# Patient Record
Sex: MALE | Race: WHITE | Employment: OTHER | URBAN - METROPOLITAN AREA
[De-identification: names, ages, dates, MRNs, and addresses within clinical notes are randomized per-mention and may not be internally consistent; named-entity substitution may affect disease eponyms.]

---

## 2018-09-16 ENCOUNTER — APPOINTMENT (OUTPATIENT)
Dept: GENERAL RADIOLOGY | Age: 73
End: 2018-09-16
Payer: MEDICARE

## 2018-09-16 ENCOUNTER — HOSPITAL ENCOUNTER (EMERGENCY)
Age: 73
Discharge: LEFT BEFORE DISCHARGE | End: 2018-09-16
Attending: EMERGENCY MEDICINE
Payer: MEDICARE

## 2018-09-16 VITALS
RESPIRATION RATE: 16 BRPM | SYSTOLIC BLOOD PRESSURE: 173 MMHG | BODY MASS INDEX: 30.18 KG/M2 | OXYGEN SATURATION: 98 % | TEMPERATURE: 97.4 F | HEIGHT: 62 IN | WEIGHT: 164 LBS | HEART RATE: 80 BPM | DIASTOLIC BLOOD PRESSURE: 85 MMHG

## 2018-09-16 DIAGNOSIS — R07.9 CHEST PAIN, UNSPECIFIED TYPE: Primary | ICD-10-CM

## 2018-09-16 LAB
ANION GAP SERPL CALCULATED.3IONS-SCNC: 13 MMOL/L (ref 7–16)
BUN BLDV-MCNC: 21 MG/DL (ref 8–23)
CALCIUM SERPL-MCNC: 9.4 MG/DL (ref 8.6–10.2)
CHLORIDE BLD-SCNC: 99 MMOL/L (ref 98–107)
CO2: 25 MMOL/L (ref 22–29)
CREAT SERPL-MCNC: 1.1 MG/DL (ref 0.7–1.2)
GFR AFRICAN AMERICAN: >60
GFR NON-AFRICAN AMERICAN: >60 ML/MIN/1.73
GLUCOSE BLD-MCNC: 154 MG/DL (ref 74–109)
HCT VFR BLD CALC: 39.1 % (ref 37–54)
HEMOGLOBIN: 13.4 G/DL (ref 12.5–16.5)
MCH RBC QN AUTO: 29.6 PG (ref 26–35)
MCHC RBC AUTO-ENTMCNC: 34.3 % (ref 32–34.5)
MCV RBC AUTO: 86.3 FL (ref 80–99.9)
PDW BLD-RTO: 13.5 FL (ref 11.5–15)
PLATELET # BLD: 156 E9/L (ref 130–450)
PMV BLD AUTO: 11.2 FL (ref 7–12)
POTASSIUM SERPL-SCNC: 4 MMOL/L (ref 3.5–5)
PRO-BNP: 140 PG/ML (ref 0–125)
RBC # BLD: 4.53 E12/L (ref 3.8–5.8)
SODIUM BLD-SCNC: 137 MMOL/L (ref 132–146)
TROPONIN: <0.01 NG/ML (ref 0–0.03)
WBC # BLD: 8.7 E9/L (ref 4.5–11.5)

## 2018-09-16 PROCEDURE — 96374 THER/PROPH/DIAG INJ IV PUSH: CPT

## 2018-09-16 PROCEDURE — 6370000000 HC RX 637 (ALT 250 FOR IP): Performed by: EMERGENCY MEDICINE

## 2018-09-16 PROCEDURE — 80048 BASIC METABOLIC PNL TOTAL CA: CPT

## 2018-09-16 PROCEDURE — 2500000003 HC RX 250 WO HCPCS: Performed by: EMERGENCY MEDICINE

## 2018-09-16 PROCEDURE — 71045 X-RAY EXAM CHEST 1 VIEW: CPT

## 2018-09-16 PROCEDURE — 85027 COMPLETE CBC AUTOMATED: CPT

## 2018-09-16 PROCEDURE — 83880 ASSAY OF NATRIURETIC PEPTIDE: CPT

## 2018-09-16 PROCEDURE — 99285 EMERGENCY DEPT VISIT HI MDM: CPT

## 2018-09-16 PROCEDURE — 84484 ASSAY OF TROPONIN QUANT: CPT

## 2018-09-16 PROCEDURE — S0028 INJECTION, FAMOTIDINE, 20 MG: HCPCS | Performed by: EMERGENCY MEDICINE

## 2018-09-16 RX ORDER — ASPIRIN 81 MG/1
243 TABLET, CHEWABLE ORAL ONCE
Status: COMPLETED | OUTPATIENT
Start: 2018-09-16 | End: 2018-09-16

## 2018-09-16 RX ADMIN — ASPIRIN 81 MG 243 MG: 81 TABLET ORAL at 20:23

## 2018-09-16 RX ADMIN — FAMOTIDINE 20 MG: 10 INJECTION, SOLUTION INTRAVENOUS at 20:23

## 2018-09-16 ASSESSMENT — ENCOUNTER SYMPTOMS
EYE REDNESS: 0
ABDOMINAL PAIN: 0
DIARRHEA: 0
NAUSEA: 0
VOMITING: 0
EYE DISCHARGE: 0
SHORTNESS OF BREATH: 1
BACK PAIN: 0
SORE THROAT: 0
EYE PAIN: 0
WHEEZING: 0
SINUS PRESSURE: 0
COUGH: 0

## 2018-09-16 ASSESSMENT — PAIN SCALES - GENERAL
PAINLEVEL_OUTOF10: 8
PAINLEVEL_OUTOF10: 0

## 2018-09-16 ASSESSMENT — PAIN DESCRIPTION - ORIENTATION: ORIENTATION: MID

## 2018-09-16 ASSESSMENT — PAIN DESCRIPTION - ONSET: ONSET: SUDDEN

## 2018-09-16 ASSESSMENT — PAIN DESCRIPTION - DESCRIPTORS: DESCRIPTORS: PATIENT UNABLE TO DESCRIBE

## 2018-09-16 ASSESSMENT — PAIN DESCRIPTION - PAIN TYPE: TYPE: ACUTE PAIN

## 2018-09-16 ASSESSMENT — PAIN DESCRIPTION - FREQUENCY: FREQUENCY: CONTINUOUS

## 2018-09-16 ASSESSMENT — PAIN DESCRIPTION - LOCATION: LOCATION: CHEST

## 2018-09-16 NOTE — ED NOTES
FIRST PROVIDER CONTACT ASSESSMENT NOTE      Department of Emergency Medicine   9/16/18  7:25 PM    Chief Complaint: Chest Pain (started this afternoon, midsternal - pain increases with deep inspiration) and Shortness of Breath      History of Present Illness:    Unruly Morel is a 68 y.o. male who presents to the ED by private car for chest pain  Focused Screening Exam:  Constitutional:  Alert, appears stated age and is in no distress. *ALLERGIES*     Patient has no known allergies.      ED Triage Vitals   BP Temp Temp src Pulse Resp SpO2 Height Weight   -- -- -- -- -- -- -- --        Initial Plan of Care:  Initiate Treatment-Testing, Proceed toTreatment Area When Bed Available for ED Attending/MLP to Continue Care    -----------------END OF FIRST PROVIDER CONTACT ASSESSMENT NOTE--------------  Electronically signed by SHAMA Moscoso   DD: 9/16/18     SHAMA Moscoso  09/16/18 2001

## 2018-09-16 NOTE — ED PROVIDER NOTES
79-year-old male who presents for evaluation of chest pain. Patient describes midsternal chest pain that was 8/10 in severity and he describes as feeling his chest is going to explode. Reports the pain is currently better at 6/10. Reports pain was worse with exertion. He complains of associated shortness of breath. He does report history of GERD and did eat a lot of salsa and spicy food today. Reports he took 3 Prilosec tablets without relief of his pain. Reports his last stress test was 3 years ago. He has a history of hypertension, high cholesterol, diabetes. Review of Systems   Constitutional: Negative for chills and fever. HENT: Negative for ear pain, sinus pressure and sore throat. Eyes: Negative for pain, discharge and redness. Respiratory: Positive for shortness of breath. Negative for cough and wheezing. Cardiovascular: Positive for chest pain. Negative for palpitations and leg swelling. Gastrointestinal: Negative for abdominal pain, diarrhea, nausea and vomiting. Genitourinary: Negative for dysuria and frequency. Musculoskeletal: Negative for arthralgias and back pain. Skin: Negative for rash and wound. Neurological: Negative for weakness and headaches. Hematological: Negative for adenopathy. All other systems reviewed and are negative. Physical Exam   Constitutional: He is oriented to person, place, and time. He appears well-developed and well-nourished. HENT:   Head: Normocephalic and atraumatic. Eyes: Conjunctivae are normal.   Neck: Normal range of motion. Neck supple. Cardiovascular: Normal rate, regular rhythm and normal heart sounds. No murmur heard. Pulmonary/Chest: Effort normal and breath sounds normal. No respiratory distress. He has no wheezes. He has no rales. Abdominal: Soft. Bowel sounds are normal. There is tenderness (epigastric). There is no rebound and no guarding. Musculoskeletal: He exhibits no edema, tenderness or deformity. show no change  Repeat physical examination is not changed    I have spoken with the patient and discussed todays availabe results to this point, in addition to providing specific details for the plan of care and counseling regarding the diagnosis and prognosis. Their questions are answered, however they are not agreeable to admission.     --------------------------------- ADDITIONAL PROVIDER NOTES ---------------------------------  This patient has chosen to leave against medical advice. I have personally explained to them that choosing to do so may result in permanent bodily harm or death. I discussed at length that without further evaluation and monitoring there may be unforeseen circumstances and deterioration resulting in permanent bodily harm or death as a result of their choice. They are alert, oriented, and competent at this time. They state that they are aware of the serious risks as explained, but they continue to wish to leave against medical   advice. In light of their decision to leave against medical advice, follow-up has been arranged and they are aware of the importance of following up as instructed. They have been advised that they should return to the ED immediately if they change their mind at any time, or if their condition begins to change or worsen. The follow up plan has been discussed in detail and they are aware of the specific conditions for emergent return, as well as the importance of follow-up. There are no discharge medications for this patient. Diagnosis:  1. Chest pain, unspecified type        Disposition:  Patient's disposition: Left against medical advice. Patient's condition is stable.            Austen Huang DO  Resident  09/16/18 [id number]

## 2018-09-17 NOTE — ED NOTES
Pt states \"I feel better and I want to leave\". Explained AMA process in detail and informed him that plan of care was not complete, that we would like for him to stay for additional monitoring. Pt stated again that he wanted to leave. Obtained signed AMA from pt and educated on discharge instructions.      Scott Gutierres RN  09/16/18 [id number]

## 2018-09-21 LAB
EKG ATRIAL RATE: 62 BPM
EKG P AXIS: 57 DEGREES
EKG P-R INTERVAL: 130 MS
EKG Q-T INTERVAL: 412 MS
EKG QRS DURATION: 82 MS
EKG QTC CALCULATION (BAZETT): 418 MS
EKG R AXIS: 37 DEGREES
EKG T AXIS: 65 DEGREES
EKG VENTRICULAR RATE: 62 BPM

## 2021-02-25 ENCOUNTER — HOSPITAL ENCOUNTER (INPATIENT)
Age: 76
LOS: 3 days | Discharge: HOME HEALTH | DRG: 522 | End: 2021-02-28
Attending: EMERGENCY MEDICINE | Admitting: INTERNAL MEDICINE
Payer: MEDICARE

## 2021-02-25 ENCOUNTER — APPOINTMENT (OUTPATIENT)
Dept: GENERAL RADIOLOGY | Age: 76
DRG: 522 | End: 2021-02-25
Payer: MEDICARE

## 2021-02-25 DIAGNOSIS — S72.002A CLOSED FRACTURE OF NECK OF LEFT FEMUR, INITIAL ENCOUNTER (HCC): Primary | ICD-10-CM

## 2021-02-25 LAB
ABO/RH: NORMAL
ALBUMIN SERPL-MCNC: 4.5 G/DL (ref 3.5–5.2)
ALP BLD-CCNC: 140 U/L (ref 40–129)
ALT SERPL-CCNC: 48 U/L (ref 0–40)
ANION GAP SERPL CALCULATED.3IONS-SCNC: 11 MMOL/L (ref 7–16)
ANTIBODY SCREEN: NORMAL
APTT: 27.4 SEC (ref 24.5–35.1)
AST SERPL-CCNC: 38 U/L (ref 0–39)
BASOPHILS ABSOLUTE: 0.02 E9/L (ref 0–0.2)
BASOPHILS RELATIVE PERCENT: 0.2 % (ref 0–2)
BILIRUB SERPL-MCNC: 0.6 MG/DL (ref 0–1.2)
BUN BLDV-MCNC: 16 MG/DL (ref 8–23)
CALCIUM SERPL-MCNC: 9 MG/DL (ref 8.6–10.2)
CHLORIDE BLD-SCNC: 103 MMOL/L (ref 98–107)
CO2: 24 MMOL/L (ref 22–29)
CREAT SERPL-MCNC: 1.1 MG/DL (ref 0.7–1.2)
EOSINOPHILS ABSOLUTE: 0.31 E9/L (ref 0.05–0.5)
EOSINOPHILS RELATIVE PERCENT: 3.2 % (ref 0–6)
GFR AFRICAN AMERICAN: >60
GFR NON-AFRICAN AMERICAN: >60 ML/MIN/1.73
GLUCOSE BLD-MCNC: 132 MG/DL (ref 74–99)
HCT VFR BLD CALC: 40 % (ref 37–54)
HEMOGLOBIN: 13.2 G/DL (ref 12.5–16.5)
IMMATURE GRANULOCYTES #: 0.07 E9/L
IMMATURE GRANULOCYTES %: 0.7 % (ref 0–5)
INR BLD: 1
LYMPHOCYTES ABSOLUTE: 0.77 E9/L (ref 1.5–4)
LYMPHOCYTES RELATIVE PERCENT: 8 % (ref 20–42)
MCH RBC QN AUTO: 28.6 PG (ref 26–35)
MCHC RBC AUTO-ENTMCNC: 33 % (ref 32–34.5)
MCV RBC AUTO: 86.8 FL (ref 80–99.9)
METER GLUCOSE: 271 MG/DL (ref 74–99)
MONOCYTES ABSOLUTE: 0.4 E9/L (ref 0.1–0.95)
MONOCYTES RELATIVE PERCENT: 4.2 % (ref 2–12)
NEUTROPHILS ABSOLUTE: 8.02 E9/L (ref 1.8–7.3)
NEUTROPHILS RELATIVE PERCENT: 83.7 % (ref 43–80)
PDW BLD-RTO: 13.8 FL (ref 11.5–15)
PLATELET # BLD: 162 E9/L (ref 130–450)
PMV BLD AUTO: 10.4 FL (ref 7–12)
POTASSIUM REFLEX MAGNESIUM: 4.2 MMOL/L (ref 3.5–5)
PROTHROMBIN TIME: 11.4 SEC (ref 9.3–12.4)
RBC # BLD: 4.61 E12/L (ref 3.8–5.8)
SODIUM BLD-SCNC: 138 MMOL/L (ref 132–146)
TOTAL PROTEIN: 7 G/DL (ref 6.4–8.3)
WBC # BLD: 9.6 E9/L (ref 4.5–11.5)

## 2021-02-25 PROCEDURE — 6360000002 HC RX W HCPCS: Performed by: INTERNAL MEDICINE

## 2021-02-25 PROCEDURE — 2580000003 HC RX 258: Performed by: INTERNAL MEDICINE

## 2021-02-25 PROCEDURE — 85025 COMPLETE CBC W/AUTO DIFF WBC: CPT

## 2021-02-25 PROCEDURE — 71045 X-RAY EXAM CHEST 1 VIEW: CPT

## 2021-02-25 PROCEDURE — 73502 X-RAY EXAM HIP UNI 2-3 VIEWS: CPT

## 2021-02-25 PROCEDURE — 99284 EMERGENCY DEPT VISIT MOD MDM: CPT

## 2021-02-25 PROCEDURE — 6370000000 HC RX 637 (ALT 250 FOR IP): Performed by: INTERNAL MEDICINE

## 2021-02-25 PROCEDURE — 99222 1ST HOSP IP/OBS MODERATE 55: CPT | Performed by: INTERNAL MEDICINE

## 2021-02-25 PROCEDURE — 85610 PROTHROMBIN TIME: CPT

## 2021-02-25 PROCEDURE — 86900 BLOOD TYPING SEROLOGIC ABO: CPT

## 2021-02-25 PROCEDURE — 85730 THROMBOPLASTIN TIME PARTIAL: CPT

## 2021-02-25 PROCEDURE — 86901 BLOOD TYPING SEROLOGIC RH(D): CPT

## 2021-02-25 PROCEDURE — 82962 GLUCOSE BLOOD TEST: CPT

## 2021-02-25 PROCEDURE — 80053 COMPREHEN METABOLIC PANEL: CPT

## 2021-02-25 PROCEDURE — 86850 RBC ANTIBODY SCREEN: CPT

## 2021-02-25 PROCEDURE — 1200000000 HC SEMI PRIVATE

## 2021-02-25 RX ORDER — PROMETHAZINE HYDROCHLORIDE 25 MG/1
12.5 TABLET ORAL EVERY 6 HOURS PRN
Status: DISCONTINUED | OUTPATIENT
Start: 2021-02-25 | End: 2021-02-28 | Stop reason: HOSPADM

## 2021-02-25 RX ORDER — SODIUM CHLORIDE 0.9 % (FLUSH) 0.9 %
10 SYRINGE (ML) INJECTION PRN
Status: DISCONTINUED | OUTPATIENT
Start: 2021-02-25 | End: 2021-02-28 | Stop reason: HOSPADM

## 2021-02-25 RX ORDER — FENTANYL CITRATE 50 UG/ML
50 INJECTION, SOLUTION INTRAMUSCULAR; INTRAVENOUS
Status: DISCONTINUED | OUTPATIENT
Start: 2021-02-25 | End: 2021-02-25

## 2021-02-25 RX ORDER — TAMSULOSIN HYDROCHLORIDE 0.4 MG/1
0.4 CAPSULE ORAL DAILY
COMMUNITY

## 2021-02-25 RX ORDER — AMLODIPINE BESYLATE 5 MG/1
5 TABLET ORAL DAILY
Status: DISCONTINUED | OUTPATIENT
Start: 2021-02-26 | End: 2021-02-28 | Stop reason: HOSPADM

## 2021-02-25 RX ORDER — OMEPRAZOLE 20 MG/1
20 CAPSULE, DELAYED RELEASE ORAL DAILY
COMMUNITY

## 2021-02-25 RX ORDER — ACETAMINOPHEN 325 MG/1
650 TABLET ORAL EVERY 6 HOURS PRN
Status: DISCONTINUED | OUTPATIENT
Start: 2021-02-25 | End: 2021-02-27 | Stop reason: SDUPTHER

## 2021-02-25 RX ORDER — SODIUM CHLORIDE 0.9 % (FLUSH) 0.9 %
10 SYRINGE (ML) INJECTION EVERY 12 HOURS SCHEDULED
Status: DISCONTINUED | OUTPATIENT
Start: 2021-02-25 | End: 2021-02-28 | Stop reason: HOSPADM

## 2021-02-25 RX ORDER — DONEPEZIL HYDROCHLORIDE 5 MG/1
10 TABLET, FILM COATED ORAL DAILY
Status: DISCONTINUED | OUTPATIENT
Start: 2021-02-26 | End: 2021-02-28 | Stop reason: HOSPADM

## 2021-02-25 RX ORDER — FLUOXETINE 10 MG/1
10 CAPSULE ORAL DAILY
COMMUNITY

## 2021-02-25 RX ORDER — ASPIRIN 81 MG/1
81 TABLET ORAL DAILY
Status: ON HOLD | COMMUNITY
End: 2021-02-28 | Stop reason: HOSPADM

## 2021-02-25 RX ORDER — LOSARTAN POTASSIUM 50 MG/1
100 TABLET ORAL DAILY
Status: DISCONTINUED | OUTPATIENT
Start: 2021-02-26 | End: 2021-02-28 | Stop reason: HOSPADM

## 2021-02-25 RX ORDER — PANTOPRAZOLE SODIUM 40 MG/1
40 TABLET, DELAYED RELEASE ORAL
Status: DISCONTINUED | OUTPATIENT
Start: 2021-02-26 | End: 2021-02-28 | Stop reason: HOSPADM

## 2021-02-25 RX ORDER — DEXTROSE MONOHYDRATE 50 MG/ML
100 INJECTION, SOLUTION INTRAVENOUS PRN
Status: DISCONTINUED | OUTPATIENT
Start: 2021-02-25 | End: 2021-02-28 | Stop reason: HOSPADM

## 2021-02-25 RX ORDER — LOSARTAN POTASSIUM 100 MG/1
100 TABLET ORAL DAILY
COMMUNITY

## 2021-02-25 RX ORDER — AMLODIPINE BESYLATE 5 MG/1
5 TABLET ORAL DAILY
COMMUNITY

## 2021-02-25 RX ORDER — ATORVASTATIN CALCIUM 40 MG/1
40 TABLET, FILM COATED ORAL DAILY
Status: DISCONTINUED | OUTPATIENT
Start: 2021-02-26 | End: 2021-02-28 | Stop reason: HOSPADM

## 2021-02-25 RX ORDER — POLYETHYLENE GLYCOL 3350 17 G/17G
17 POWDER, FOR SOLUTION ORAL DAILY PRN
Status: DISCONTINUED | OUTPATIENT
Start: 2021-02-25 | End: 2021-02-28 | Stop reason: HOSPADM

## 2021-02-25 RX ORDER — ACETAMINOPHEN 650 MG/1
650 SUPPOSITORY RECTAL EVERY 6 HOURS PRN
Status: DISCONTINUED | OUTPATIENT
Start: 2021-02-25 | End: 2021-02-27 | Stop reason: SDUPTHER

## 2021-02-25 RX ORDER — NICOTINE POLACRILEX 4 MG
15 LOZENGE BUCCAL PRN
Status: DISCONTINUED | OUTPATIENT
Start: 2021-02-25 | End: 2021-02-28 | Stop reason: HOSPADM

## 2021-02-25 RX ORDER — FLUOXETINE 10 MG/1
10 CAPSULE ORAL DAILY
Status: DISCONTINUED | OUTPATIENT
Start: 2021-02-26 | End: 2021-02-26 | Stop reason: CLARIF

## 2021-02-25 RX ORDER — SODIUM CHLORIDE 9 MG/ML
INJECTION, SOLUTION INTRAVENOUS CONTINUOUS
Status: DISCONTINUED | OUTPATIENT
Start: 2021-02-26 | End: 2021-02-28 | Stop reason: HOSPADM

## 2021-02-25 RX ORDER — ONDANSETRON 2 MG/ML
4 INJECTION INTRAMUSCULAR; INTRAVENOUS EVERY 6 HOURS PRN
Status: DISCONTINUED | OUTPATIENT
Start: 2021-02-25 | End: 2021-02-28 | Stop reason: HOSPADM

## 2021-02-25 RX ORDER — GLIPIZIDE 10 MG/1
10 TABLET ORAL
COMMUNITY

## 2021-02-25 RX ORDER — DEXTROSE MONOHYDRATE 25 G/50ML
12.5 INJECTION, SOLUTION INTRAVENOUS PRN
Status: DISCONTINUED | OUTPATIENT
Start: 2021-02-25 | End: 2021-02-28 | Stop reason: HOSPADM

## 2021-02-25 RX ORDER — ATORVASTATIN CALCIUM 40 MG/1
40 TABLET, FILM COATED ORAL DAILY
COMMUNITY

## 2021-02-25 RX ORDER — TAMSULOSIN HYDROCHLORIDE 0.4 MG/1
0.4 CAPSULE ORAL DAILY
Status: DISCONTINUED | OUTPATIENT
Start: 2021-02-26 | End: 2021-02-28 | Stop reason: HOSPADM

## 2021-02-25 RX ADMIN — INSULIN LISPRO 2 UNITS: 100 INJECTION, SOLUTION INTRAVENOUS; SUBCUTANEOUS at 23:41

## 2021-02-25 RX ADMIN — SODIUM CHLORIDE: 9 INJECTION, SOLUTION INTRAVENOUS at 23:21

## 2021-02-25 RX ADMIN — HYDROMORPHONE HYDROCHLORIDE 0.5 MG: 1 INJECTION, SOLUTION INTRAMUSCULAR; INTRAVENOUS; SUBCUTANEOUS at 23:21

## 2021-02-25 ASSESSMENT — PAIN SCALES - GENERAL
PAINLEVEL_OUTOF10: 8
PAINLEVEL_OUTOF10: 6

## 2021-02-25 ASSESSMENT — PAIN DESCRIPTION - PROGRESSION: CLINICAL_PROGRESSION: NOT CHANGED

## 2021-02-25 ASSESSMENT — PAIN DESCRIPTION - ORIENTATION: ORIENTATION: LEFT

## 2021-02-25 ASSESSMENT — PAIN DESCRIPTION - PAIN TYPE: TYPE: ACUTE PAIN

## 2021-02-25 ASSESSMENT — PAIN DESCRIPTION - FREQUENCY: FREQUENCY: CONTINUOUS

## 2021-02-25 ASSESSMENT — PAIN DESCRIPTION - DESCRIPTORS: DESCRIPTORS: SHARP

## 2021-02-25 NOTE — ED NOTES
Bed: 01  Expected date:   Expected time:   Means of arrival:   Comments:  EMS     Rand Salcido RN  02/25/21 [id number]

## 2021-02-25 NOTE — H&P
[facility] History and Physical    CHIEF COMPLAINT: Hip pain    History of Present Illness: Patient is a 36-year-old male with past medical history significant for diabetes, hypertension. He presented to the emergency department with complaints of left hip pain after sustaining a mechanical fall this evening. He reports that his pain gets better with any type of motion, and there is tenderness to palpation. He reports that his full occurred while he was attempting to carry a table up a flight of stairs and he lost his balance and fell down approximately 7 stairs. He denies hitting his head. He denies loss of consciousness. He landed on the left hip. He is unable to ambulate since the incident. His ED work-up is significant for an x-ray showing a left femoral neck fracture. ED provider has placed a consult for orthopedics. Medicine was asked to admit. REVIEW OF SYSTEMS:  A comprehensive review of systems was negative except for: what is in the HPI    PMH:  Past Medical History:   Diagnosis Date    Agent orange exposure     Diabetes mellitus ([facility] [address]     Hypertension        Surgical History:  Past Surgical History:   Procedure Laterality Date    ARM SURGERY      left    CIRCUMCISION       Medications Prior to Admission:    Prior to Admission medications    Not on File     Allergies:    Patient has no known allergies. Social History:    reports that he has quit smoking. He has never used smokeless tobacco. He reports that he does not drink alcohol or use drugs. Family History:   family history is not on file.   Reviewed, not pertinent to admission    PHYSICAL EXAM:  Vitals:  /65   Pulse 73   Temp 97.6 °F (36.4 °C) (Oral)   Resp 16   SpO2 95%     General Appearance: alert and oriented to person, place and time and in no acute distress  Skin: warm and dry  Head: normocephalic and atraumatic  Eyes: pupils equal, round, and reactive to light, extraocular eye movements intact, conjunctivae normal  Neck: neck supple and non tender without mass   Pulmonary/Chest: clear to auscultation bilaterally- no wheezes, rales or rhonchi, normal air movement, no respiratory distress  Cardiovascular: normal rate, normal S1 and S2 and no carotid bruits  Abdomen: soft, non-tender, non-distended, normal bowel sounds, no masses or organomegaly  Extremities: Tenderness in the left hip joint. Decreased range of motion secondary to pain. Neurologic: no cranial nerve deficit and speech normal    LABS:  Recent Labs     02/25/21  1716      K 4.2      CO2 24   BUN 16   CREATININE 1.1   GLUCOSE 132*   CALCIUM 9.0       Recent Labs     02/25/21  1716   WBC 9.6   RBC 4.61   HGB 13.2   HCT 40.0   MCV 86.8   MCH 28.6   MCHC 33.0   RDW 13.8      MPV 10.4       No results for input(s): POCGLU in the last 72 hours. CBC:   Lab Results   Component Value Date    WBC 9.6 02/25/2021    RBC 4.61 02/25/2021    HGB 13.2 02/25/2021    HCT 40.0 02/25/2021    MCV 86.8 02/25/2021    MCH 28.6 02/25/2021    MCHC 33.0 02/25/2021    RDW 13.8 02/25/2021     02/25/2021    MPV 10.4 02/25/2021     CMP:    Lab Results   Component Value Date     02/25/2021    K 4.2 02/25/2021     02/25/2021    CO2 24 02/25/2021    BUN 16 02/25/2021    CREATININE 1.1 02/25/2021    GFRAA >60 02/25/2021    LABGLOM >60 02/25/2021    GLUCOSE 132 02/25/2021    PROT 7.0 02/25/2021    LABALBU 4.5 02/25/2021    CALCIUM 9.0 02/25/2021    BILITOT 0.6 02/25/2021    ALKPHOS 140 02/25/2021    AST 38 02/25/2021    ALT 48 02/25/2021       Radiology:   XR HIP 2-3 VW W PELVIS LEFT   Final Result   Nondisplaced left femoral neck fracture      XR CHEST PORTABLE   Final Result   No pneumonia or pleural effusion. ASSESSMENT:      Active Problems:    Hip fracture requiring operative repair, left, closed, initial encounter [facility])  Resolved Problems:    * No resolved hospital problems.  *    PLAN:    Left femoral neck fracture, nondisplaced  80-year-old male presenting with left hip pain after mechanical fall. X-ray shows nondisplaced left femoral neck fracture.  -Admit to surgical unit  -Orthopedic surgery consult  -Pain control  -N.p.o. after midnight    Non-insulin-dependent diabetes  -Sliding scale insulin  -Hypoglycemia protocol  -Glucose checks before meals and at bedtime    Hypertension  -Continue home dose of losartan, amlodipine    Dementia  -Continue home dose of donepezil, fluoxetine    GERD  -Continue PPI    BPH  -Continue home dose of tamsulosin    Hyperlipidemia  -Continue home dose of atorvastatin    Code Status: Full  DVT prophylaxis: SCDs    NOTE: This report was transcribed using voice recognition software. Every effort was made to ensure accuracy; however, inadvertent computerized transcription errors may be present.   Electronically signed by Gisselle Chacon DO on 2/25/2021 at 6:05 PM

## 2021-02-25 NOTE — ED PROVIDER NOTES
HPI:  2/25/21, Time: 4:49 PM ZOE Velázquez is a 77 y.o. male presenting to the ED for left hip pain occurring prior to arrival.  Symptoms have been moderate in severity and constant since onset. Pain is worse with movement and better with rest.  He did not take anything for his symptoms. States that he was carrying a table up a flight of stairs when he lost his balance and fell down approximately 7 steps. No head trauma or loss of consciousness. States he fell onto his left hip. He has been unable to ambulate since the incident. He denies headache, neck pain, back pain, chest pain, shortness of breath, abdominal pain, nausea, and vomiting. He takes no anticoagulation. Review of Systems:   Pertinent positives and negatives are stated within HPI, all other systems reviewed and are negative.          --------------------------------------------- PAST HISTORY ---------------------------------------------  Past Medical History:  has a past medical history of Agent orange exposure, Diabetes mellitus ([address] and Hypertension. Past Surgical History:  has a past surgical history that includes Arm Surgery and Circumcision. Social History:  reports that he has quit smoking. He has never used smokeless tobacco. He reports that he does not drink alcohol or use drugs. Family History: family history is not on file. The patients home medications have been reviewed. Allergies: Patient has no known allergies.     -------------------------------------------------- RESULTS -------------------------------------------------  All laboratory and radiology results have been personally reviewed by myself   LABS:  Results for orders placed or performed during the hospital encounter of 02/25/21   CBC Auto Differential   Result Value Ref Range    WBC 9.6 4.5 - 11.5 E9/L    RBC 4.61 3.80 - 5.80 E12/L    Hemoglobin 13.2 12.5 - 16.5 g/dL    Hematocrit 40.0 37.0 - 54.0 %    MCV 86.8 80.0 - 99.9 fL    MCH 28.6 26.0 - 35.0 pg    MCHC 33.0 32.0 - 34.5 %    RDW 13.8 11.5 - 15.0 fL    Platelets 067 697 - 108 E9/L    MPV 10.4 7.0 - 12.0 fL    Neutrophils % 83.7 (H) 43.0 - 80.0 %    Immature Granulocytes % 0.7 0.0 - 5.0 %    Lymphocytes % 8.0 (L) 20.0 - 42.0 %    Monocytes % 4.2 2.0 - 12.0 %    Eosinophils % 3.2 0.0 - 6.0 %    Basophils % 0.2 0.0 - 2.0 %    Neutrophils Absolute 8.02 (H) 1.80 - 7.30 E9/L    Immature Granulocytes # 0.07 E9/L    Lymphocytes Absolute 0.77 (L) 1.50 - 4.00 E9/L    Monocytes Absolute 0.40 0.10 - 0.95 E9/L    Eosinophils Absolute 0.31 0.05 - 0.50 E9/L    Basophils Absolute 0.02 0.00 - 0.20 E9/L   Comprehensive Metabolic Panel w/ Reflex to MG   Result Value Ref Range    Sodium 138 132 - 146 mmol/L    Potassium reflex Magnesium 4.2 3.5 - 5.0 mmol/L    Chloride 103 98 - 107 mmol/L    CO2 24 22 - 29 mmol/L    Anion Gap 11 7 - 16 mmol/L    Glucose 132 (H) 74 - 99 mg/dL    BUN 16 8 - 23 mg/dL    CREATININE 1.1 0.7 - 1.2 mg/dL    GFR Non-African American >60 >=60 mL/min/1.73    GFR African American >60     Calcium 9.0 8.6 - 10.2 mg/dL    Total Protein 7.0 6.4 - 8.3 g/dL    Albumin 4.5 3.5 - 5.2 g/dL    Total Bilirubin 0.6 0.0 - 1.2 mg/dL    Alkaline Phosphatase 140 (H) 40 - 129 U/L    ALT 48 (H) 0 - 40 U/L    AST 38 0 - 39 U/L   Protime-INR   Result Value Ref Range    Protime 11.4 9.3 - 12.4 sec    INR 1.0    APTT   Result Value Ref Range    aPTT 27.4 24.5 - 35.1 sec   TYPE AND SCREEN   Result Value Ref Range    ABO/Rh O POS     Antibody Screen NEG        RADIOLOGY:  Interpreted by Radiologist.  XR HIP 2-3 VW W PELVIS LEFT   Final Result   Nondisplaced left femoral neck fracture      XR CHEST PORTABLE   Final Result   No pneumonia or pleural effusion.          ------------------------- NURSING NOTES AND VITALS REVIEWED ---------------------------   The nursing notes within the ED encounter and vital signs as below have been reviewed.    /65   Pulse 73   Temp 97.6 °F (36.4 °C) (Oral)   Resp 16 SpO2 95%   Oxygen Saturation Interpretation: Normal      ---------------------------------------------------PHYSICAL EXAM--------------------------------------      PHYSICAL EXAM:  Vitals Reviewed  Constitutional/General: Alert and oriented x3, well appearing, non toxic in NAD. HEENT: Normocephalic and atraumatic. PERRL, EOMI. Oropharynx clear, handling secretions, no trismus. No raccoon eyes. No leigh's sign. Neck: Supple, full ROM, no cervical spine tenderness. Pulmonary: Lungs clear to auscultation bilaterally, no wheezes, rales, or rhonchi. Not in respiratory distress. Cardiovascular:  Regular rate and rhythm, no murmurs, gallops, or rubs. 2+ distal pulses. Chest: No chest wall tenderness. No crepitus. Abdomen: Soft, non tender, non distended,   Pelvis: Tenderness to left lateral hip with decreased range of motion due to pain, no distal femur tenderness, soft and easily compressible compartments, DP and PT pulses intact. Superficial abrasion near left elbow without tenderness or effusion, normal range of motion to elbow, no warmth erythema to joint, no tenderness to upper arm or forearm. Extremities: Moves all extremities x 4. Warm and well perfused. Back: No midline thoracic or lumbar tenderness. Skin: warm and dry without rash. Neurologic: GCS 15, 5/5 strength in all extremities. Normal sensation in all extremities.   Psych: Normal Affect.      ------------------------------ ED COURSE/MEDICAL DECISION MAKING----------------------  Medications   sodium chloride flush 0.9 % injection 10 mL (has no administration in time range)   sodium chloride flush 0.9 % injection 10 mL (has no administration in time range)   promethazine (PHENERGAN) tablet 12.5 mg (has no administration in time range)     Or   ondansetron (ZOFRAN) injection 4 mg (has no administration in time range)   polyethylene glycol (GLYCOLAX) packet 17 g (has no administration in time range)   acetaminophen (TYLENOL) tablet 650 mg (has no administration in time range)     Or   acetaminophen (TYLENOL) suppository 650 mg (has no administration in time range)   HYDROmorphone (DILAUDID) injection 0.5 mg (has no administration in time range)   0.9 % sodium chloride infusion (has no administration in time range)       Medical Decision Making/ED COURSE:   Patient is a 61-year-old male presenting after mechanical fall with left hip pain. In the ED, patient was hemodynamically stable and afebrile. On exam, patient had left hip tenderness and decreased range of motion. No focal neurologic deficits. Labs, CXR, and left hip/pelvic xray obtained. Patient ordered fentanyl. I reviewed and interpreted labs. Labs unremarkable. Imaging showed a left femoral neck fracture. Orthopedic surgery was consulted, and they will evaluate the patient. Discussed with the hospitalist who will admit. Patient remained hemodynamically stable throughout ED course. ED Course as of Feb 25 2011   Thu Feb 25, 2021   1756 Hospitalist was consulted. Dr. Lovely Page accepted the patient for admission. [JA]   12[address] Orthopedic surgery consulted. Spoke with PA for Dr. Ruben Matt. Will evaluate patient. Likely surgery tomorrow. [JA]      ED Course User Index  [JA] Jada Reese MD         Counseling: The emergency provider has spoken with the patient and spouse/SO and discussed todays results, in addition to providing specific details for the plan of care and counseling regarding the diagnosis and prognosis. Questions are answered at this time and they are agreeable with the plan.      --------------------------------- IMPRESSION AND DISPOSITION ---------------------------------    IMPRESSION  1. Closed fracture of neck of left femur, initial encounter ([facility] [address]        DISPOSITION  Disposition: Admit to med/surg floor  Patient condition is stable      NOTE: This report was transcribed using voice recognition software.  Every effort was made to ensure accuracy; however, inadvertent computerized transcription errors may be present    IPrakash MD, am the primary provider of this record       Prakash Joe MD  02/25/21 2012

## 2021-02-26 ENCOUNTER — ANESTHESIA EVENT (OUTPATIENT)
Dept: OPERATING ROOM | Age: 76
DRG: 522 | End: 2021-02-26
Payer: MEDICARE

## 2021-02-26 ENCOUNTER — ANESTHESIA (OUTPATIENT)
Dept: OPERATING ROOM | Age: 76
DRG: 522 | End: 2021-02-26
Payer: MEDICARE

## 2021-02-26 ENCOUNTER — APPOINTMENT (OUTPATIENT)
Dept: GENERAL RADIOLOGY | Age: 76
DRG: 522 | End: 2021-02-26
Payer: MEDICARE

## 2021-02-26 VITALS — TEMPERATURE: 84.2 F | DIASTOLIC BLOOD PRESSURE: 81 MMHG | OXYGEN SATURATION: 100 % | SYSTOLIC BLOOD PRESSURE: 136 MMHG

## 2021-02-26 LAB
ANION GAP SERPL CALCULATED.3IONS-SCNC: 9 MMOL/L (ref 7–16)
BASOPHILS ABSOLUTE: 0.01 E9/L (ref 0–0.2)
BASOPHILS RELATIVE PERCENT: 0.1 % (ref 0–2)
BUN BLDV-MCNC: 12 MG/DL (ref 8–23)
CALCIUM SERPL-MCNC: 8.5 MG/DL (ref 8.6–10.2)
CHLORIDE BLD-SCNC: 107 MMOL/L (ref 98–107)
CO2: 24 MMOL/L (ref 22–29)
CREAT SERPL-MCNC: 0.9 MG/DL (ref 0.7–1.2)
EOSINOPHILS ABSOLUTE: 0.08 E9/L (ref 0.05–0.5)
EOSINOPHILS RELATIVE PERCENT: 1 % (ref 0–6)
GFR AFRICAN AMERICAN: >60
GFR NON-AFRICAN AMERICAN: >60 ML/MIN/1.73
GLUCOSE BLD-MCNC: 80 MG/DL (ref 74–99)
HCT VFR BLD CALC: 40.5 % (ref 37–54)
HEMOGLOBIN: 13.4 G/DL (ref 12.5–16.5)
IMMATURE GRANULOCYTES #: 0.02 E9/L
IMMATURE GRANULOCYTES %: 0.3 % (ref 0–5)
LYMPHOCYTES ABSOLUTE: 0.82 E9/L (ref 1.5–4)
LYMPHOCYTES RELATIVE PERCENT: 10.7 % (ref 20–42)
MCH RBC QN AUTO: 28.5 PG (ref 26–35)
MCHC RBC AUTO-ENTMCNC: 33.1 % (ref 32–34.5)
MCV RBC AUTO: 86.2 FL (ref 80–99.9)
METER GLUCOSE: 106 MG/DL (ref 74–99)
METER GLUCOSE: 112 MG/DL (ref 74–99)
METER GLUCOSE: 113 MG/DL (ref 74–99)
METER GLUCOSE: 122 MG/DL (ref 74–99)
METER GLUCOSE: 123 MG/DL (ref 74–99)
METER GLUCOSE: 418 MG/DL (ref 74–99)
METER GLUCOSE: 84 MG/DL (ref 74–99)
MONOCYTES ABSOLUTE: 0.65 E9/L (ref 0.1–0.95)
MONOCYTES RELATIVE PERCENT: 8.5 % (ref 2–12)
NEUTROPHILS ABSOLUTE: 6.08 E9/L (ref 1.8–7.3)
NEUTROPHILS RELATIVE PERCENT: 79.4 % (ref 43–80)
PDW BLD-RTO: 13.8 FL (ref 11.5–15)
PLATELET # BLD: 156 E9/L (ref 130–450)
PMV BLD AUTO: 10 FL (ref 7–12)
POTASSIUM SERPL-SCNC: 3.5 MMOL/L (ref 3.5–5)
RBC # BLD: 4.7 E12/L (ref 3.8–5.8)
SODIUM BLD-SCNC: 140 MMOL/L (ref 132–146)
WBC # BLD: 7.7 E9/L (ref 4.5–11.5)

## 2021-02-26 PROCEDURE — 2500000003 HC RX 250 WO HCPCS: Performed by: PHYSICIAN ASSISTANT

## 2021-02-26 PROCEDURE — 2500000003 HC RX 250 WO HCPCS: Performed by: NURSE ANESTHETIST, CERTIFIED REGISTERED

## 2021-02-26 PROCEDURE — 2580000003 HC RX 258: Performed by: NURSE ANESTHETIST, CERTIFIED REGISTERED

## 2021-02-26 PROCEDURE — 82962 GLUCOSE BLOOD TEST: CPT

## 2021-02-26 PROCEDURE — 72170 X-RAY EXAM OF PELVIS: CPT

## 2021-02-26 PROCEDURE — 6360000002 HC RX W HCPCS: Performed by: INTERNAL MEDICINE

## 2021-02-26 PROCEDURE — 88311 DECALCIFY TISSUE: CPT

## 2021-02-26 PROCEDURE — 36415 COLL VENOUS BLD VENIPUNCTURE: CPT

## 2021-02-26 PROCEDURE — 3700000000 HC ANESTHESIA ATTENDED CARE: Performed by: ORTHOPAEDIC SURGERY

## 2021-02-26 PROCEDURE — 2580000003 HC RX 258: Performed by: PHYSICIAN ASSISTANT

## 2021-02-26 PROCEDURE — 6360000002 HC RX W HCPCS: Performed by: ORTHOPAEDIC SURGERY

## 2021-02-26 PROCEDURE — 6370000000 HC RX 637 (ALT 250 FOR IP): Performed by: INTERNAL MEDICINE

## 2021-02-26 PROCEDURE — 2580000003 HC RX 258: Performed by: INTERNAL MEDICINE

## 2021-02-26 PROCEDURE — 3700000001 HC ADD 15 MINUTES (ANESTHESIA): Performed by: ORTHOPAEDIC SURGERY

## 2021-02-26 PROCEDURE — 88305 TISSUE EXAM BY PATHOLOGIST: CPT

## 2021-02-26 PROCEDURE — 87081 CULTURE SCREEN ONLY: CPT

## 2021-02-26 PROCEDURE — C1776 JOINT DEVICE (IMPLANTABLE): HCPCS | Performed by: ORTHOPAEDIC SURGERY

## 2021-02-26 PROCEDURE — 1200000000 HC SEMI PRIVATE

## 2021-02-26 PROCEDURE — 2709999900 HC NON-CHARGEABLE SUPPLY: Performed by: ORTHOPAEDIC SURGERY

## 2021-02-26 PROCEDURE — 7100000000 HC PACU RECOVERY - FIRST 15 MIN: Performed by: ORTHOPAEDIC SURGERY

## 2021-02-26 PROCEDURE — 3600000004 HC SURGERY LEVEL 4 BASE: Performed by: ORTHOPAEDIC SURGERY

## 2021-02-26 PROCEDURE — 0SRS0JA REPLACEMENT OF LEFT HIP JOINT, FEMORAL SURFACE WITH SYNTHETIC SUBSTITUTE, UNCEMENTED, OPEN APPROACH: ICD-10-PCS | Performed by: ORTHOPAEDIC SURGERY

## 2021-02-26 PROCEDURE — 80048 BASIC METABOLIC PNL TOTAL CA: CPT

## 2021-02-26 PROCEDURE — 3600000014 HC SURGERY LEVEL 4 ADDTL 15MIN: Performed by: ORTHOPAEDIC SURGERY

## 2021-02-26 PROCEDURE — 85025 COMPLETE CBC W/AUTO DIFF WBC: CPT

## 2021-02-26 PROCEDURE — 7100000001 HC PACU RECOVERY - ADDTL 15 MIN: Performed by: ORTHOPAEDIC SURGERY

## 2021-02-26 PROCEDURE — 6360000002 HC RX W HCPCS: Performed by: NURSE ANESTHETIST, CERTIFIED REGISTERED

## 2021-02-26 PROCEDURE — 6360000002 HC RX W HCPCS: Performed by: PHYSICIAN ASSISTANT

## 2021-02-26 PROCEDURE — 99232 SBSQ HOSP IP/OBS MODERATE 35: CPT | Performed by: INTERNAL MEDICINE

## 2021-02-26 DEVICE — HEAD FEM OD50MM ID26MM HIP UNIV SYS UHR: Type: IMPLANTABLE DEVICE | Site: HIP | Status: FUNCTIONAL

## 2021-02-26 DEVICE — STEM FEM SZ 3 L120MM NK L35MM +43MM OFFSET 127DEG HIP CO: Type: IMPLANTABLE DEVICE | Site: HIP | Status: FUNCTIONAL

## 2021-02-26 DEVICE — HEAD FEM DIA26MM -3MM OFFSET HIP VIT POLYETH NK V40 TAPR: Type: IMPLANTABLE DEVICE | Site: HIP | Status: FUNCTIONAL

## 2021-02-26 RX ORDER — SODIUM CHLORIDE 9 MG/ML
INJECTION, SOLUTION INTRAVENOUS CONTINUOUS PRN
Status: DISCONTINUED | OUTPATIENT
Start: 2021-02-26 | End: 2021-02-26 | Stop reason: SDUPTHER

## 2021-02-26 RX ORDER — ONDANSETRON 2 MG/ML
4 INJECTION INTRAMUSCULAR; INTRAVENOUS
Status: DISCONTINUED | OUTPATIENT
Start: 2021-02-26 | End: 2021-02-26 | Stop reason: HOSPADM

## 2021-02-26 RX ORDER — FENTANYL CITRATE 50 UG/ML
INJECTION, SOLUTION INTRAMUSCULAR; INTRAVENOUS PRN
Status: DISCONTINUED | OUTPATIENT
Start: 2021-02-26 | End: 2021-02-26 | Stop reason: SDUPTHER

## 2021-02-26 RX ORDER — FLUOXETINE 10 MG/1
10 TABLET, FILM COATED ORAL DAILY
Status: DISCONTINUED | OUTPATIENT
Start: 2021-02-26 | End: 2021-02-28 | Stop reason: HOSPADM

## 2021-02-26 RX ORDER — VANCOMYCIN HYDROCHLORIDE 500 MG/10ML
INJECTION, POWDER, LYOPHILIZED, FOR SOLUTION INTRAVENOUS PRN
Status: DISCONTINUED | OUTPATIENT
Start: 2021-02-26 | End: 2021-02-26 | Stop reason: ALTCHOICE

## 2021-02-26 RX ORDER — GLYCOPYRROLATE 1 MG/5 ML
SYRINGE (ML) INTRAVENOUS PRN
Status: DISCONTINUED | OUTPATIENT
Start: 2021-02-26 | End: 2021-02-26 | Stop reason: SDUPTHER

## 2021-02-26 RX ORDER — LIDOCAINE HYDROCHLORIDE 10 MG/ML
INJECTION, SOLUTION INFILTRATION; PERINEURAL PRN
Status: DISCONTINUED | OUTPATIENT
Start: 2021-02-26 | End: 2021-02-26 | Stop reason: SDUPTHER

## 2021-02-26 RX ORDER — EPHEDRINE SULFATE/0.9% NACL/PF 50 MG/5 ML
SYRINGE (ML) INTRAVENOUS PRN
Status: DISCONTINUED | OUTPATIENT
Start: 2021-02-26 | End: 2021-02-26 | Stop reason: SDUPTHER

## 2021-02-26 RX ORDER — MEPERIDINE HYDROCHLORIDE 25 MG/ML
12.5 INJECTION INTRAMUSCULAR; INTRAVENOUS; SUBCUTANEOUS EVERY 5 MIN PRN
Status: DISCONTINUED | OUTPATIENT
Start: 2021-02-26 | End: 2021-02-26 | Stop reason: HOSPADM

## 2021-02-26 RX ORDER — PROPOFOL 10 MG/ML
INJECTION, EMULSION INTRAVENOUS CONTINUOUS PRN
Status: DISCONTINUED | OUTPATIENT
Start: 2021-02-26 | End: 2021-02-26 | Stop reason: SDUPTHER

## 2021-02-26 RX ORDER — BUPIVACAINE HYDROCHLORIDE 7.5 MG/ML
INJECTION, SOLUTION INTRASPINAL PRN
Status: DISCONTINUED | OUTPATIENT
Start: 2021-02-26 | End: 2021-02-26 | Stop reason: SDUPTHER

## 2021-02-26 RX ADMIN — SODIUM CHLORIDE: 9 INJECTION, SOLUTION INTRAVENOUS at 19:45

## 2021-02-26 RX ADMIN — HYDROMORPHONE HYDROCHLORIDE 0.5 MG: 1 INJECTION, SOLUTION INTRAMUSCULAR; INTRAVENOUS; SUBCUTANEOUS at 06:11

## 2021-02-26 RX ADMIN — BUPIVACAINE HYDROCHLORIDE IN DEXTROSE 1.6 ML: 7.5 INJECTION, SOLUTION SUBARACHNOID at 18:50

## 2021-02-26 RX ADMIN — SODIUM CHLORIDE: 9 INJECTION, SOLUTION INTRAVENOUS at 13:30

## 2021-02-26 RX ADMIN — AMLODIPINE BESYLATE 5 MG: 5 TABLET ORAL at 09:59

## 2021-02-26 RX ADMIN — PHENYLEPHRINE HYDROCHLORIDE 100 MCG: 10 INJECTION INTRAVENOUS at 19:56

## 2021-02-26 RX ADMIN — LOSARTAN POTASSIUM 100 MG: 50 TABLET, FILM COATED ORAL at 09:59

## 2021-02-26 RX ADMIN — PROPOFOL 125 MCG/KG/MIN: 10 INJECTION, EMULSION INTRAVENOUS at 19:01

## 2021-02-26 RX ADMIN — INSULIN LISPRO 6 UNITS: 100 INJECTION, SOLUTION INTRAVENOUS; SUBCUTANEOUS at 06:18

## 2021-02-26 RX ADMIN — Medication 10 MG: at 19:46

## 2021-02-26 RX ADMIN — FENTANYL CITRATE 50 MCG: 50 INJECTION, SOLUTION INTRAMUSCULAR; INTRAVENOUS at 18:38

## 2021-02-26 RX ADMIN — Medication 0.2 MG: at 19:26

## 2021-02-26 RX ADMIN — FENTANYL CITRATE 25 MCG: 50 INJECTION, SOLUTION INTRAMUSCULAR; INTRAVENOUS at 18:50

## 2021-02-26 RX ADMIN — PHENYLEPHRINE HYDROCHLORIDE 100 MCG: 10 INJECTION INTRAVENOUS at 19:24

## 2021-02-26 RX ADMIN — TRANEXAMIC ACID 1000 MG: 1 INJECTION, SOLUTION INTRAVENOUS at 20:51

## 2021-02-26 RX ADMIN — Medication 2000 MG: at 18:34

## 2021-02-26 RX ADMIN — SODIUM CHLORIDE: 9 INJECTION, SOLUTION INTRAVENOUS at 18:34

## 2021-02-26 RX ADMIN — TRANEXAMIC ACID 1000 MG: 1 INJECTION, SOLUTION INTRAVENOUS at 19:05

## 2021-02-26 RX ADMIN — HYDROMORPHONE HYDROCHLORIDE 0.5 MG: 1 INJECTION, SOLUTION INTRAMUSCULAR; INTRAVENOUS; SUBCUTANEOUS at 10:28

## 2021-02-26 RX ADMIN — LIDOCAINE HYDROCHLORIDE 2 ML: 10 INJECTION, SOLUTION INFILTRATION; PERINEURAL at 18:44

## 2021-02-26 ASSESSMENT — PULMONARY FUNCTION TESTS
PIF_VALUE: 1
PIF_VALUE: 14
PIF_VALUE: 1
PIF_VALUE: 3
PIF_VALUE: 1
PIF_VALUE: 15
PIF_VALUE: 15
PIF_VALUE: 1
PIF_VALUE: 15
PIF_VALUE: 1
PIF_VALUE: 15
PIF_VALUE: 1
PIF_VALUE: 16
PIF_VALUE: 1
PIF_VALUE: 1
PIF_VALUE: 15
PIF_VALUE: 1
PIF_VALUE: 15
PIF_VALUE: 15
PIF_VALUE: 1
PIF_VALUE: 0
PIF_VALUE: 15
PIF_VALUE: 1
PIF_VALUE: 2
PIF_VALUE: 1

## 2021-02-26 ASSESSMENT — PAIN DESCRIPTION - PROGRESSION: CLINICAL_PROGRESSION: NOT CHANGED

## 2021-02-26 ASSESSMENT — PAIN DESCRIPTION - ORIENTATION
ORIENTATION: LEFT

## 2021-02-26 ASSESSMENT — PAIN DESCRIPTION - ONSET: ONSET: ON-GOING

## 2021-02-26 ASSESSMENT — PAIN DESCRIPTION - PAIN TYPE
TYPE: SURGICAL PAIN
TYPE: ACUTE PAIN
TYPE: SURGICAL PAIN

## 2021-02-26 ASSESSMENT — PAIN SCALES - GENERAL
PAINLEVEL_OUTOF10: 5
PAINLEVEL_OUTOF10: 1
PAINLEVEL_OUTOF10: 0
PAINLEVEL_OUTOF10: 7
PAINLEVEL_OUTOF10: 0
PAINLEVEL_OUTOF10: 0
PAINLEVEL_OUTOF10: 2
PAINLEVEL_OUTOF10: 0

## 2021-02-26 ASSESSMENT — PAIN DESCRIPTION - DESCRIPTORS: DESCRIPTORS: ACHING;DISCOMFORT;SHARP

## 2021-02-26 ASSESSMENT — PAIN DESCRIPTION - FREQUENCY: FREQUENCY: CONTINUOUS

## 2021-02-26 ASSESSMENT — COPD QUESTIONNAIRES: CAT_SEVERITY: MODERATE

## 2021-02-26 ASSESSMENT — PAIN DESCRIPTION - LOCATION: LOCATION: HIP

## 2021-02-26 NOTE — CONSULTS
daily  amLODIPine (NORVASC) 5 MG tablet, Take 5 mg by mouth daily  empagliflozin (JARDIANCE) 25 MG tablet, Take 25 mg by mouth daily  metFORMIN (GLUCOPHAGE) 1000 MG tablet, Take 1,000 mg by mouth 2 times daily (with meals)  aspirin 81 MG EC tablet, Take 81 mg by mouth daily  Semaglutide (OZEMPIC, 0.25 OR 0.5 MG/DOSE, SC), Inject 0.5 mg into the skin once a week  Allergies:  Patient has no known allergies. Social History:   Social History     Socioeconomic History    Marital status:      Spouse name: Not on file    Number of children: Not on file    Years of education: Not on file    Highest education level: Not on file   Occupational History    Not on file   Social Needs    Financial resource strain: Not on file    Food insecurity     Worry: Not on file     Inability: Not on file    Transportation needs     Medical: Not on file     Non-medical: Not on file   Tobacco Use    Smoking status: Former Smoker    Smokeless tobacco: Never Used   Substance and Sexual Activity    Alcohol use: No    Drug use: No    Sexual activity: Not on file   Lifestyle    Physical activity     Days per week: Not on file     Minutes per session: Not on file    Stress: Not on file   Relationships    Social connections     Talks on phone: Not on file     Gets together: Not on file     Attends [employer] service: Not on file     Active member of club or organization: Not on file     Attends meetings of clubs or organizations: Not on file     Relationship status: Not on file    Intimate partner violence     Fear of current or ex partner: Not on file     Emotionally abused: Not on file     Physically abused: Not on file     Forced sexual activity: Not on file   Other Topics Concern    Not on file   Social History Narrative    Not on file       Family History:   No family history on file.   REVIEW OF SYSTEMS:  (POSITVE IN BOLD)  General:  Fever     Chills     Fatigue     Nausea / Vomiting     Night sweats  Skin: Sores MPV 10.4 02/25/2021      CMP:          Lab Results   Component Value Date      02/25/2021     K 4.2 02/25/2021      02/25/2021     CO2 24 02/25/2021     BUN 16 02/25/2021     CREATININE 1.1 02/25/2021     GFRAA >60 02/25/2021     LABGLOM >60 02/25/2021     GLUCOSE 132 02/25/2021     PROT 7.0 02/25/2021     LABALBU 4.5 02/25/2021     CALCIUM 9.0 02/25/2021     BILITOT 0.6 02/25/2021     ALKPHOS 140 02/25/2021     AST 38 02/25/2021     ALT 48 02/25/2021          ASSESSMENT AND PLAN:    Left hip subcapital fracture    Given the patient's subjective complaints, physical exam findings, findings on radiographs, operative intervention in the form of a left hip bipolar hemiarthroplasty has been recommended. Risks benefits details, options, complications, alternatives and convalescence have been reviewed by Dr. Mariano Gardiner. Patient is fully in understanding of his condition and the risks and benefits of surgery and wishes to proceed. We will plan for surgery this afternoon. He will remain n.p.o. In anticipation of surgery. We will hold anticoagulation until after surgery. JAMAAL hose and SCDs used for DVT prophylaxis for now. Thank you for the consultation. Patient seen and independently evaluated by Dr. Mariano Gardiner this morning. Mala Leal PA-C on 2/26/2021 at 7:46 AM     Agree with above. Patient wishes to proceed with surgery. Keep NPO. Optimize for surgery today.     Luna Snider

## 2021-02-26 NOTE — PROGRESS NOTES
[facility] Hospitalist   Progress Note    Admitting Date and Time: 2/25/2021  4:32 PM  Admit Dx: Hip fracture requiring operative repair, left, closed, initial encounter ([facility] [address] Sayra Dietz     Seen for follow on left hip fx and other problems as lsited below    Subjective:  Denies CP ,sob , n/v/d/abd pain. Left hip pain well controlled with current med. D/w nursing , uneventful night . ROS: denies fever, chills, cp, sob, n/v, HA unless stated above.      FLUoxetine  10 mg Oral Daily    tranexamic acid (CYCLOKAPRON) IVPB  1,000 mg Intravenous On Call to OR    sodium chloride flush  10 mL Intravenous 2 times per day    insulin lispro  0-6 Units Subcutaneous TID WC    insulin lispro  0-3 Units Subcutaneous Nightly    atorvastatin  40 mg Oral Daily    amLODIPine  5 mg Oral Daily    donepezil  10 mg Oral Daily    losartan  100 mg Oral Daily    pantoprazole  40 mg Oral QAM AC    tamsulosin  0.4 mg Oral Daily         sodium chloride flush, 10 mL, PRN      promethazine, 12.5 mg, Q6H PRN    Or      ondansetron, 4 mg, Q6H PRN      polyethylene glycol, 17 g, Daily PRN      acetaminophen, 650 mg, Q6H PRN    Or      acetaminophen, 650 mg, Q6H PRN      HYDROmorphone, 0.5 mg, Q4H PRN      glucose, 15 g, PRN      dextrose, 12.5 g, PRN      glucagon (rDNA), 1 mg, PRN      dextrose, 100 mL/hr, PRN         Objective:    BP (!) 140/80   Pulse 78   Temp 97.7 °F (36.5 °C) (Infrared)   Resp 16   SpO2 97%   General Appearance: alert and oriented to person, place and time,  in no acute distress  Skin: warm and dry  Head: normocephalic and atraumatic  Eyes: pupils equal, round, and reactive to light, extraocular eye movements intact, conjunctivae normal  Neck: supple and non-tender without mass  Pulmonary/Chest: clear to auscultation bilaterally  Cardiovascular: normal rate, regular rhythm, normal S1 and S2  Abdomen: soft, non-tender, non-distended, normal bowel sounds, no masses or

## 2021-02-26 NOTE — BRIEF OP NOTE
Brief Postoperative Note      Patient: Dayday Sandoval  YOB: 1945  MRN: [id number]    Date of Procedure: 2/26/2021    Pre-Op Diagnosis: LEFT HIP COMPLETE DISPLACED FEMORAL NECK FRACTURE    Post-Op Diagnosis: Same       Procedure(s):  HIP HEMIARTHROPLASTY , LEFT HIP BIPOLAR     Surgeon(s):  Stan Hairston MD    Assistant:  * No surgical staff found *    Anesthesia: spinal    Estimated Blood Loss (mL): less than 183     Complications: None    Specimens:   Sent    Implants:  See dict      Drains: * No LDAs found *    Findings: see dict    Electronically signed by Stan Hairston MD on 2/26/2021 at 6:37 PM

## 2021-02-26 NOTE — ANESTHESIA PRE PROCEDURE
JUANITO Hernandez        tranexamic acid (CYKLOKAPRON) 1,000 mg in dextrose 5 % 100 mL IVPB  1,000 mg Intravenous On Call to [address] [place] [address] (Pa) Yaz Flynn PA-C        tranexamic acid (CYKLOKAPRON) 1,000 mg in dextrose 5 % 100 mL IVPB  1,000 mg Intravenous On Call to [address] [place] [address] (Pa) JUANITO Hernandez        sodium chloride flush 0.9 % injection 10 mL  10 mL Intravenous 2 times per day Darrelyn [place], DO        sodium chloride flush 0.9 % injection 10 mL  10 mL Intravenous PRN Darrelyn [place], DO        promethazine (PHENERGAN) tablet 12.5 mg  12.5 mg Oral Q6H PRN Darrelyn [place], DO        Or    ondansetron (ZOFRAN) injection 4 mg  4 mg Intravenous Q6H PRN Darrelyn [place], DO        polyethylene glycol (GLYCOLAX) packet 17 g  17 g Oral Daily PRN Darrelyn [place], DO        acetaminophen (TYLENOL) tablet 650 mg  650 mg Oral Q6H PRN Darrelyn [place], DO        Or    acetaminophen (TYLENOL) suppository 650 mg  650 mg Rectal Q6H PRN Darrelyn [place], DO        HYDROmorphone (DILAUDID) injection 0.5 mg  0.5 mg Intravenous Q4H PRN Darrelyn [place], DO   0.5 mg at 02/26/21 1028    0.9 % sodium chloride infusion   Intravenous Continuous Darrelyn [place], DO 75 mL/hr at 02/26/21 1330 New Bag at 02/26/21 1330    insulin lispro (HUMALOG) injection vial 0-6 Units  0-6 Units Subcutaneous TID WC Darrelyn [place], DO   6 Units at 02/26/21 0618    insulin lispro (HUMALOG) injection vial 0-3 Units  0-3 Units Subcutaneous Nightly Darrelyn [place], DO   2 Units at 02/25/21 2341    atorvastatin (LIPITOR) tablet 40 mg  40 mg Oral Daily Darrelyn [place], DO        amLODIPine (NORVASC) tablet 5 mg  5 mg Oral Daily Darrelyn [place], DO   5 mg at 02/26/21 0959    donepezil (ARICEPT) tablet 10 mg  10 mg Oral Daily Darrelyn [place], DO        losartan (COZAAR) tablet 100 mg  100 mg Oral Daily Darrelyn [place], DO   100 mg at 02/26/21 0959    pantoprazole (PROTONIX) tablet 40 mg  40 mg Oral QAM AC Darrelyn [place], DO        tamsulosin (FLOMAX) capsule 0.4 mg  0.4 mg Oral Daily Faina Lancaster Shandra, DO        glucose (GLUTOSE) 40 % oral gel 15 g  15 g Oral PRN Alise Cost, DO        dextrose 50 % IV solution  12.5 g Intravenous PRN Alise Cost, DO        glucagon (rDNA) injection 1 mg  1 mg Intramuscular PRN Alise Cost, DO        dextrose 5 % solution  100 mL/hr Intravenous PRN Alise Cost, DO           Allergies:  No Known Allergies    Problem List:    Patient Active Problem List   Diagnosis Code    Hip fracture requiring operative repair, left, closed, initial encounter ([facility] S72.002A       Past Medical History:        Diagnosis Date    Agent orange exposure     Diabetes mellitus ([facility]     Hypertension        Past Surgical History:        Procedure Laterality Date    ARM SURGERY      left    CIRCUMCISION         Social History:    Social History     Tobacco Use    Smoking status: Former Smoker    Smokeless tobacco: Never Used   Substance Use Topics    Alcohol use: No                                Counseling given: Not Answered      Vital Signs (Current):   Vitals:    02/25/21 1647 02/25/21 2155 02/26/21 0715 02/26/21 1215   BP:  132/70 (!) 140/80    Pulse:  78     Resp: 16 16 16    Temp:  98 °F (36.7 °C) 97.7 °F (36.5 °C)    TempSrc:  Oral Infrared    SpO2:   97%    Weight:    157 lb (71.2 kg)   Height:    5' 2\" (1.575 m)                                              BP Readings from Last 3 Encounters:   02/26/21 (!) 140/80   09/16/18 (!) 173/85       NPO Status: Time of last liquid consumption: 0959                        Time of last solid consumption: 0000                        Date of last liquid consumption: 02/26/21                        Date of last solid food consumption: 02/25/21    BMI:   Wt Readings from Last 3 Encounters:   02/26/21 157 lb (71.2 kg)   09/16/18 164 lb (74.4 kg)     Body mass index is 28.72 kg/m².     CBC:   Lab Results   Component Value Date    WBC 7.7 02/26/2021    RBC 4.70 02/26/2021    HGB 13.4 02/26/2021    HCT 40.5 02/26/2021    MCV 86.2 02/26/2021    RDW 13.8 02/26/2021     02/26/2021       CMP:   Lab Results   Component Value Date     02/26/2021    K 3.5 02/26/2021    K 4.2 02/25/2021     02/26/2021    CO2 24 02/26/2021    BUN 12 02/26/2021    CREATININE 0.9 02/26/2021    GFRAA >60 02/26/2021    LABGLOM >60 02/26/2021    GLUCOSE 80 02/26/2021    PROT 7.0 02/25/2021    CALCIUM 8.5 02/26/2021    BILITOT 0.6 02/25/2021    ALKPHOS 140 02/25/2021    AST 38 02/25/2021    ALT 48 02/25/2021       POC Tests: No results for input(s): POCGLU, POCNA, POCK, POCCL, POCBUN, POCHEMO, POCHCT in the last 72 hours. Coags:   Lab Results   Component Value Date    PROTIME 11.4 02/25/2021    INR 1.0 02/25/2021    APTT 27.4 02/25/2021       HCG (If Applicable): No results found for: PREGTESTUR, PREGSERUM, HCG, HCGQUANT     ABGs: No results found for: PHART, PO2ART, QSU3ECL, KPU0LVQ, BEART, Y2KLGWMF     Type & Screen (If Applicable):  No results found for: LABABO, LABRH    Drug/Infectious Status (If Applicable):  No results found for: HIV, HEPCAB    COVID-19 Screening (If Applicable): No results found for: COVID19      Anesthesia Evaluation  Patient summary reviewed no history of anesthetic complications:   Airway: Mallampati: II  TM distance: >3 FB   Neck ROM: full  Mouth opening: > = 3 FB Dental: normal exam         Pulmonary: breath sounds clear to auscultation  (+) COPD: moderate and severe,                            ROS comment: Former smoker   Cardiovascular:  Exercise tolerance: good (>4 METS),   (+) hypertension: moderate,       ECG reviewed  Rhythm: regular  Rate: normal                    Neuro/Psych:   Negative Neuro/Psych ROS              GI/Hepatic/Renal:        (-) liver disease and no renal disease       Endo/Other:    (+) DiabetesType II DM, , .    (-) hypothyroidism, blood dyscrasia               Abdominal:         (-) obese     Vascular: negative vascular ROS.                                        Anesthesia Plan      spinal ASA 3       Induction: intravenous. MIPS: Postoperative opioids intended and Prophylactic antiemetics administered. Anesthetic plan and risks discussed with patient and spouse. Use of blood products discussed with patient whom consented to blood products. Plan discussed with CRNA.                   Reji Singh MD   2/26/2021

## 2021-02-26 NOTE — CARE COORDINATION
Social work:    Social service met with Mr. Joyce Moura, explained social service role, and discussed discharge planning. Mr. Joyce Moura resides in a two-story home with his wife Nathan Winn. He has two-entry steps to get into  the home and his bedroom & bathroom on the main floor. [facility] and only has a wheelchair that belonged to his late wife. Mr. Olena Meyer is active at the [employer] [facility] with Dr. Jordan Velasco and his PCP is Dr. Eric Stiles, however, he has not been in to see him in 1-2- years. Upon discharge from [facility], Mr. Joyce Moura prefers to return home with [facility]/DME. Social service faxed a tentative referral to Dr. Jordan Velasco requesting he follow the [place] [id number], which will need to be formerly [facility], due to [employer] contact. A referral was given to Lisa at [facility] at [facility] for a wheeled walker. Social service to follow up tomorrow and confirm plans.     Electronically signed by MALCOLM Erickson on 2/26/2021 at 12:45 PM

## 2021-02-27 LAB
ANION GAP SERPL CALCULATED.3IONS-SCNC: 8 MMOL/L (ref 7–16)
BUN BLDV-MCNC: 13 MG/DL (ref 8–23)
CALCIUM SERPL-MCNC: 8 MG/DL (ref 8.6–10.2)
CHLORIDE BLD-SCNC: 104 MMOL/L (ref 98–107)
CO2: 22 MMOL/L (ref 22–29)
CREAT SERPL-MCNC: 0.9 MG/DL (ref 0.7–1.2)
GFR AFRICAN AMERICAN: >60
GFR NON-AFRICAN AMERICAN: >60 ML/MIN/1.73
GLUCOSE BLD-MCNC: 198 MG/DL (ref 74–99)
HCT VFR BLD CALC: 40.2 % (ref 37–54)
HEMOGLOBIN: 13.5 G/DL (ref 12.5–16.5)
MCH RBC QN AUTO: 29.3 PG (ref 26–35)
MCHC RBC AUTO-ENTMCNC: 33.6 % (ref 32–34.5)
MCV RBC AUTO: 87.4 FL (ref 80–99.9)
METER GLUCOSE: 265 MG/DL (ref 74–99)
METER GLUCOSE: 270 MG/DL (ref 74–99)
METER GLUCOSE: 292 MG/DL (ref 74–99)
MRSA CULTURE ONLY: NORMAL
PDW BLD-RTO: 14 FL (ref 11.5–15)
PLATELET # BLD: 133 E9/L (ref 130–450)
PMV BLD AUTO: 9.9 FL (ref 7–12)
POTASSIUM SERPL-SCNC: 3.7 MMOL/L (ref 3.5–5)
RBC # BLD: 4.6 E12/L (ref 3.8–5.8)
SODIUM BLD-SCNC: 134 MMOL/L (ref 132–146)
WBC # BLD: 8.3 E9/L (ref 4.5–11.5)

## 2021-02-27 PROCEDURE — 80048 BASIC METABOLIC PNL TOTAL CA: CPT

## 2021-02-27 PROCEDURE — 99232 SBSQ HOSP IP/OBS MODERATE 35: CPT | Performed by: INTERNAL MEDICINE

## 2021-02-27 PROCEDURE — 6360000002 HC RX W HCPCS: Performed by: ORTHOPAEDIC SURGERY

## 2021-02-27 PROCEDURE — 1200000000 HC SEMI PRIVATE

## 2021-02-27 PROCEDURE — 6370000000 HC RX 637 (ALT 250 FOR IP): Performed by: ORTHOPAEDIC SURGERY

## 2021-02-27 PROCEDURE — 97535 SELF CARE MNGMENT TRAINING: CPT

## 2021-02-27 PROCEDURE — 85027 COMPLETE CBC AUTOMATED: CPT

## 2021-02-27 PROCEDURE — 82962 GLUCOSE BLOOD TEST: CPT

## 2021-02-27 PROCEDURE — 36415 COLL VENOUS BLD VENIPUNCTURE: CPT

## 2021-02-27 PROCEDURE — 6370000000 HC RX 637 (ALT 250 FOR IP): Performed by: INTERNAL MEDICINE

## 2021-02-27 PROCEDURE — 2580000003 HC RX 258: Performed by: ORTHOPAEDIC SURGERY

## 2021-02-27 PROCEDURE — 97161 PT EVAL LOW COMPLEX 20 MIN: CPT

## 2021-02-27 PROCEDURE — 97165 OT EVAL LOW COMPLEX 30 MIN: CPT

## 2021-02-27 PROCEDURE — 97110 THERAPEUTIC EXERCISES: CPT

## 2021-02-27 PROCEDURE — 97116 GAIT TRAINING THERAPY: CPT

## 2021-02-27 RX ORDER — FAMOTIDINE 20 MG/1
20 TABLET, FILM COATED ORAL 2 TIMES DAILY PRN
Status: DISCONTINUED | OUTPATIENT
Start: 2021-02-27 | End: 2021-02-28 | Stop reason: HOSPADM

## 2021-02-27 RX ORDER — SODIUM CHLORIDE 0.9 % (FLUSH) 0.9 %
10 SYRINGE (ML) INJECTION EVERY 12 HOURS SCHEDULED
Status: DISCONTINUED | OUTPATIENT
Start: 2021-02-27 | End: 2021-02-28 | Stop reason: HOSPADM

## 2021-02-27 RX ORDER — SENNA AND DOCUSATE SODIUM 50; 8.6 MG/1; MG/1
1 TABLET, FILM COATED ORAL 2 TIMES DAILY
Status: DISCONTINUED | OUTPATIENT
Start: 2021-02-27 | End: 2021-02-28 | Stop reason: HOSPADM

## 2021-02-27 RX ORDER — MORPHINE SULFATE 4 MG/ML
3 INJECTION, SOLUTION INTRAMUSCULAR; INTRAVENOUS
Status: DISCONTINUED | OUTPATIENT
Start: 2021-02-27 | End: 2021-02-28 | Stop reason: HOSPADM

## 2021-02-27 RX ORDER — MORPHINE SULFATE 2 MG/ML
2 INJECTION, SOLUTION INTRAMUSCULAR; INTRAVENOUS
Status: DISCONTINUED | OUTPATIENT
Start: 2021-02-27 | End: 2021-02-28 | Stop reason: HOSPADM

## 2021-02-27 RX ORDER — OXYCODONE HYDROCHLORIDE 5 MG/1
10 TABLET ORAL EVERY 4 HOURS PRN
Status: DISCONTINUED | OUTPATIENT
Start: 2021-02-27 | End: 2021-02-28 | Stop reason: HOSPADM

## 2021-02-27 RX ORDER — ACETAMINOPHEN 325 MG/1
650 TABLET ORAL EVERY 4 HOURS PRN
Status: DISCONTINUED | OUTPATIENT
Start: 2021-02-27 | End: 2021-02-28 | Stop reason: HOSPADM

## 2021-02-27 RX ORDER — SODIUM CHLORIDE 0.9 % (FLUSH) 0.9 %
10 SYRINGE (ML) INJECTION PRN
Status: DISCONTINUED | OUTPATIENT
Start: 2021-02-27 | End: 2021-02-28 | Stop reason: HOSPADM

## 2021-02-27 RX ORDER — OXYCODONE HYDROCHLORIDE 5 MG/1
5 TABLET ORAL EVERY 4 HOURS PRN
Status: DISCONTINUED | OUTPATIENT
Start: 2021-02-27 | End: 2021-02-28 | Stop reason: HOSPADM

## 2021-02-27 RX ADMIN — DOCUSATE SODIUM 50 MG AND SENNOSIDES 8.6 MG 1 TABLET: 8.6; 5 TABLET, FILM COATED ORAL at 22:37

## 2021-02-27 RX ADMIN — ASPIRIN 325 MG: 325 TABLET, COATED ORAL at 22:37

## 2021-02-27 RX ADMIN — FLUOXETINE HYDROCHLORIDE 10 MG: 10 TABLET ORAL at 09:10

## 2021-02-27 RX ADMIN — INSULIN LISPRO 3 UNITS: 100 INJECTION, SOLUTION INTRAVENOUS; SUBCUTANEOUS at 17:32

## 2021-02-27 RX ADMIN — DOCUSATE SODIUM 50 MG AND SENNOSIDES 8.6 MG 1 TABLET: 8.6; 5 TABLET, FILM COATED ORAL at 08:56

## 2021-02-27 RX ADMIN — PANTOPRAZOLE SODIUM 40 MG: 40 TABLET, DELAYED RELEASE ORAL at 06:48

## 2021-02-27 RX ADMIN — LOSARTAN POTASSIUM 100 MG: 50 TABLET, FILM COATED ORAL at 09:12

## 2021-02-27 RX ADMIN — TAMSULOSIN HYDROCHLORIDE 0.4 MG: 0.4 CAPSULE ORAL at 08:56

## 2021-02-27 RX ADMIN — AMLODIPINE BESYLATE 5 MG: 5 TABLET ORAL at 08:56

## 2021-02-27 RX ADMIN — DONEPEZIL HYDROCHLORIDE 10 MG: 5 TABLET, FILM COATED ORAL at 08:56

## 2021-02-27 RX ADMIN — INSULIN LISPRO 3 UNITS: 100 INJECTION, SOLUTION INTRAVENOUS; SUBCUTANEOUS at 09:13

## 2021-02-27 RX ADMIN — INSULIN LISPRO 2 UNITS: 100 INJECTION, SOLUTION INTRAVENOUS; SUBCUTANEOUS at 22:40

## 2021-02-27 RX ADMIN — ASPIRIN 325 MG: 325 TABLET, COATED ORAL at 00:42

## 2021-02-27 RX ADMIN — ATORVASTATIN CALCIUM 40 MG: 40 TABLET, FILM COATED ORAL at 08:56

## 2021-02-27 RX ADMIN — ACETAMINOPHEN 650 MG: 325 TABLET ORAL at 14:49

## 2021-02-27 RX ADMIN — SODIUM CHLORIDE, PRESERVATIVE FREE 10 ML: 5 INJECTION INTRAVENOUS at 22:38

## 2021-02-27 RX ADMIN — Medication 2000 MG: at 10:43

## 2021-02-27 RX ADMIN — Medication 2000 MG: at 03:01

## 2021-02-27 RX ADMIN — ASPIRIN 325 MG: 325 TABLET, COATED ORAL at 08:56

## 2021-02-27 ASSESSMENT — PAIN SCALES - GENERAL
PAINLEVEL_OUTOF10: 0
PAINLEVEL_OUTOF10: 5

## 2021-02-27 ASSESSMENT — PAIN DESCRIPTION - ORIENTATION: ORIENTATION: LEFT

## 2021-02-27 NOTE — PROGRESS NOTES
Ana Carlson Hospitalist   Progress Note    Admitting Date and Time: 2/25/2021  4:32 PM  Admit Dx: Hip fracture requiring operative repair, left, closed, initial encounter ([facility] Juancho Loredo     Seen for follow on left hip fx and other problems as lsited below    Subjective:  Denies CP ,sob or abd pain . Developed postop urinary retention requiring straight cath. Postop pain well controlled with current medications. Discussed with nursing. ROS: denies fever, chills, cp, sob, n/v, HA unless stated above.      sodium chloride flush  10 mL Intravenous 2 times per day    sennosides-docusate sodium  1 tablet Oral BID    ceFAZolin (ANCEF) IVPB  2,000 mg Intravenous Q8H    aspirin  325 mg Oral BID    FLUoxetine  10 mg Oral Daily    sodium chloride flush  10 mL Intravenous 2 times per day    insulin lispro  0-6 Units Subcutaneous TID WC    insulin lispro  0-3 Units Subcutaneous Nightly    atorvastatin  40 mg Oral Daily    amLODIPine  5 mg Oral Daily    donepezil  10 mg Oral Daily    losartan  100 mg Oral Daily    pantoprazole  40 mg Oral QAM AC    tamsulosin  0.4 mg Oral Daily         sodium chloride flush, 10 mL, PRN      magnesium hydroxide, 30 mL, Daily PRN      oxyCODONE, 5 mg, Q4H PRN    Or      oxyCODONE, 10 mg, Q4H PRN      acetaminophen, 650 mg, Q4H PRN      morphine, 2 mg, Q2H PRN    Or      morphine, 3 mg, Q2H PRN      famotidine, 20 mg, BID PRN      sodium chloride flush, 10 mL, PRN      promethazine, 12.5 mg, Q6H PRN    Or      ondansetron, 4 mg, Q6H PRN      polyethylene glycol, 17 g, Daily PRN      HYDROmorphone, 0.5 mg, Q4H PRN      glucose, 15 g, PRN      dextrose, 12.5 g, PRN      glucagon (rDNA), 1 mg, PRN      dextrose, 100 mL/hr, PRN         Objective:    /79   Pulse 65   Temp 97.7 °F (36.5 °C) (Oral)   Resp 16   Ht 5' 2\" (1.575 m)   Wt 157 lb (71.2 kg)   SpO2 98%   BMI 28.72 kg/m²   General Appearance: alert and oriented to person, place and time,  in no acute distress  Skin: warm and dry  Head: normocephalic and atraumatic  Eyes: pupils equal, round, and reactive to light, extraocular eye movements intact, conjunctivae normal  Neck: supple and non-tender without mass  Pulmonary/Chest: clear to auscultation bilaterally  Cardiovascular: normal rate, regular rhythm, normal S1 and S2  Abdomen: soft, non-tender, non-distended, normal bowel sounds, no masses or organomegaly  Extremities: no cyanosis, clubbing , left hip with dressing dry and intact   Musculoskeletal: normal range of motion   Neurologic:  no cranial nerve deficit, speech normal      Recent Labs     02/25/21  1716 02/26/21  1050 02/27/21  0319    140 134   K 4.2 3.5 3.7    107 104   CO2 24 24 22   BUN 16 12 13   CREATININE 1.1 0.9 0.9   GLUCOSE 132* 80 198*   CALCIUM 9.0 8.5* 8.0*       Recent Labs     02/25/21  1716 02/26/21  1050 02/27/21  0440   WBC 9.6 7.7 8.3   RBC 4.61 4.70 4.60   HGB 13.2 13.4 13.5   HCT 40.0 40.5 40.2   MCV 86.8 86.2 87.4   MCH 28.6 28.5 29.3   MCHC 33.0 33.1 33.6   RDW 13.8 13.8 14.0    156 133   MPV 10.4 10.0 9.9     Labs and images reviewed     Radiology:   XR PELVIS (1-2 VIEWS)   Final Result   Left hip hemiarthroplasty with expected postsurgical changes. No acute   complication. XR HIP 2-3 VW W PELVIS LEFT   Final Result   Nondisplaced left femoral neck fracture      XR CHEST PORTABLE   Final Result   No pneumonia or pleural effusion. Assessment:    Active Problems:    Hip fracture requiring operative repair, left, closed, initial encounter [facility])  Resolved Problems:    * No resolved hospital problems. *      Plan:  Left hip-femoral neck fracture-status post mechanical fall. Ortho following. S/p Left hip hemiarthroplasty. Continue post op care as per ortho.     DM II - on ISS , will resume oral hypoglycemics on discharge     HTN- resume as per home     HPL - on statin    BPH- on tamsulosin    Dementia - on aricept     GERD - on ppi  Full code  On scds for dvt prophy          Electronically signed by Don Irizarry MD on 2/27/2021 at 8:15 AM

## 2021-02-27 NOTE — ANESTHESIA PROCEDURE NOTES
Spinal Block    Patient location during procedure: OR  Start time: 2/26/2021 6:42 PM  End time: 2/26/2021 6:50 PM  Reason for block: primary anesthetic  Staffing  Performed: resident/CRNA   Anesthesiologist: Vazquez Negrete MD  Resident/CRNA: JASMEET Leyva CRNA  Preanesthetic Checklist  Completed: patient identified, IV checked, site marked, risks and benefits discussed, surgical consent, monitors and equipment checked, pre-op evaluation, timeout performed, anesthesia consent given, oxygen available and patient being monitored  Spinal Block  Patient position: sitting  Patient monitoring: continuous pulse ox and frequent blood pressure checks  Approach: midline  Location: L3/L4  Provider prep: mask and sterile gloves  Local infiltration: lidocaine  Agent: bupivacaine  Adjuvant: fentanyl  Dose: 1.6  Dose: 1.6  Needle  Needle type: Pencan   Needle gauge: 25 G  Needle length: 3.5 in  Assessment  Sensory level: T4  Swirl obtained: Yes  CSF: clear  Attempts: 2  Hemodynamics: stable

## 2021-02-27 NOTE — PROGRESS NOTES
Pt unable to void, st cath for 1000 ml, due to void [id number]. Pt states has problems voiding at home. Does not see urology.

## 2021-02-27 NOTE — CARE COORDINATION
Social Work:    Confirmed plans for home with Mr. Kamran Sierra. Confirmed delivery of wheeled walker. Patient has BSC. [facility] following. LVM with Juli Romero, patient's wife as well.      Electronically signed by MALCOLM Bird on 2/27/2021 at 2:32 PM

## 2021-02-27 NOTE — PROGRESS NOTES
Occupational Therapy  OCCUPATIONAL THERAPY INITIAL EVALUATION      Date:2021  Patient Name: Hyun Larson [facility]  NIXON Brown  MRN: [id number]  : 1945  Room: [address] [place] [address]    Referring Provider: Larisa Niño MD  Evaluating OT: Hunter Sanford. Dorota Quiet - [id number]    AM-PAC Daily Activity Raw Score: 17/24      Recommended Adaptive Equipment: Continue to assess. Diagnosis: Hip fracture requiring operative repair, left, closed, initial encounter [facility]) Krystal Tavarez    Patient presented to hospital s/p fall at home. Surgery: Patient underwent left hip bipolar hemiarthroplasty on 2021. Pertinent Medical History: DM, HTN, PTSD (per patient report), agent orange exposure     Precautions: falls, posterior hip dislocation precautions, FWBAT    Home Living: Patient lives with his wife in a two-floor home; patient's bedroom and bathroom are on the main living level. Patient does not have to access the basement. Bathroom Setup: walk-in shower (with seat, grab bar, and handheld shower head) and elevated toilet seat  Equipment Owned: BSC, shower chair    Prior Level of Function (PLOF): Patient reported that he had been independent with ADLs, but needed assistance with most IADLs. Driving: Yes    Pain Level: Patient reported experiencing pain in his L hip, which he rated 5 out of 10 at the start of this session; nursing aware. Cognition: Patient alert and oriented x3. WFL command follow demonstrated. Patient pleasant, cooperative, and motivated to return to [facility] and home environment. Memory: Fair+   Sequencing: WFL   Problem Solving: WFL   Judgement/Safety: Fair+    Impulsivity demonstrated occasionally. Functional Assessment:   Initial Eval Status  Date: 2021 Treatment Status  Date:  Short Term Goals   Feeding Independent     Grooming Min A for hand hygiene while standing at sink (completed twice during this session).   Mod I  (seated/standing at sink)   UB Dressing SBA  Independent   LB Dressing functional transfers. Long-Term Goal: Patient will increase functional independence to PLOF in order to allow patient to live in least restrictive environment. ROM: Additional Information:    R UE  WFL    L UE WFL      Hearing: WFL  Sensation: No complaints of numbness/tingling in B UEs. Tone: WFL  Edema: No    Comments: RN approved patient's participation in [address] [place] [address] activities. Upon arrival, patient supine in bed. At end of session, patient supine in bed (per patient preference), with call light and phone within reach and all lines and tubes intact. Patient would benefit from continued skilled OT to increase safety and independence with completion of ADL/IADL tasks for functional independence and quality of life. Treatment: OT treatment provided this date included:    Instruction/training on safety and adapted techniques for completion of LB ADLs.   Instruction/training on safe functional mobility/transfer techniques.   Instruction/training on energy conservation/work simplification for completion of ADLs. Reacher, long-handled shoe horn, long-handled sponge, sock aid, and easy slide (for donning JAMAAL hose) issued to patient during this session. Patient education via visual demonstration and verbal explanation provided regarding all AE use for completion of LB ADLs to ensure adherence to posterior hip dislocation precautions. Patient education also provided regarding potential benefits of shower chair and 3:1 (BSC) frame over toilet to maximize safety/independence with ADLs in home environment. Patient education packet provided to patient and verbally/visually reviewed with patient during this session; patient verbalized understanding of all education provided. Patient education provided regarding use of easy slide to don JAMAAL hose.     Patient education provided regardin) importance of having assistance with donning/doffing JAMAAL hose to ensure adherence to posterior hip dislocation precautions, 2) recommendations to maximize safety of home environment - including DME recommendations, 3) potential benefits of continued therapy services in home environment, 4) techniques to maximize safety with management/use of walker, particularly during ADLs, 5) posterior hip dislocation precautions and implications of these on ADLs, bed mobility, functional transfers/mobility. Patient verbalized understanding. Further skilled OT treatment indicated to increase patient's safety and independence with completion of ADL/IADL tasks in order to maximize patient's functional independence and quality of life.     Assessment of Current Deficits:   Functional Mobility [x]  ADLs [x] Strength [x]  Cognition []  Functional Transfers  [x] IADLs [x] Safety Awareness [x]  Endurance [x]  Fine Motor Coordination [] Balance [x] Vision/Perception [] Sensation []   Gross Motor Coordination [] ROM [] Delirium []                  Motor Control []    Plan of Care: 2-5 days/week for 1-2 weeks PRN  [x]ADL retraining/adaptive techniques and AE recommendations to increase functional independence within precautions  [x]Energy conservation techniques to improve tolerance for ADLs/IADLs  [x]Functional transfer/mobility training/DME recommendations for increased independence, safety and fall prevention  [x]Patient/family education to increase safety and functional independence  [x]Environmental modifications for safe mobility and completion of ADLs/IADLs  []Cognitive retraining exercises to improve problem solving skills & safe participation in ADLs/IADLs   []Sensory re-education techniques to improve extremity awareness, maintain skin integrity and improve hand function   []Visual/Perceptual retraining  to improve body awareness and safety during transfers and ADLs   []Splinting/positioning needs to maintain joint/skin integrity and prevent contractures   [x]Therapeutic activity to improve functional performance during ADLs/IADLs  [x]Therapeutic exercise to improve tolerance and functional strength for ADLs/IADLs  [x]Balance retraining/tolerance tasks for facilitation of postural control with dynamic challenges during ADLs   [x]Neuromuscular re-education: facilitate righting/equilibrium reactions, midline orientation, scapular stability/mobility, normalize muscle tone, and facilitate active functional movement/attention  []Delirium prevention/treatment   []Positioning to improve functional independence and prevent skin breakdown   []Other:     Rehab Potential: Good for established goals. Patient / Family Goal: Patient noted that he anticipates returning home upon discharge. Patient and/or family were instructed on functional diagnosis, prognosis/goals, and OT plan of care. Demonstrated Good understanding. Eval Complexity: Low    Time In: 1325  Time Out: 1438  Total Treatment Time: 73 minutes      Minutes Units   OT Eval Low 44786 10 1   OT Eval Medium 98710     OT Eval High 09136     OT Re-Eval [id number]     Therapeutic Ex 83062     Therapeutic Activities 39496     ADL/Self Care 54901 63 4   Orthotic Management 59431     Neuro Re-Ed 04555     Non-Billable Time N/A ---     Evaluation time includes thorough review of current medical information, gathering information on past medical history/social history and prior level of function, completion of standardized testing/informal observation of tasks, assessment of data, and education on plan of care and goals. WALE Macias/L  License Number: [id number]

## 2021-02-27 NOTE — PROGRESS NOTES
Orthopedics - Dr. Kayla Cabral op Day  1 Left hip bipolar hemiarthroplasty      S: Doing well this AM. Has been OOB to bathroom. Currently sitting at bedside. Minimal pain currently. Denies N/V. No CP/SOB. No BM,. + Flatus. Had to be straight cathed earlier due to inability to void. Since has voided some. O:        A+O, NAD   Aquacel Dressing CDI   Left LE - Calf/thigh soft supple NT.  Negative andrea's   Sensation intact to light touch              5/5 with PF/DF of ankle              DP 2+     H/H:   Recent Labs     02/27/21  0440   HGB 13.5   HCT 40.2        PT/INR:   Recent Labs     02/25/21  1716   PROT 7.0   INR 1.0                   Xray:  Reviewed    A/P:  S/p left hip bipolar hemiarthroplasty           PT/OT           WBAT            Posterior hip dislocation precautions           DVT Prophylaxis - ASA 325mg BID/JAMAAL/SCDs           Social work for D/C planning and equipment needs           Recheck labs in AM            Medical management per primary service    Vijaya Chaparro PA-C

## 2021-02-27 NOTE — PROGRESS NOTES
pace, reciprocal step through w/o drifting, lateral veering or LOB. No reports of fatigue reported with 20' walk; returned to chair due to hot lunch just arrived. Educated on universal hip prec with wife. Remained in hip chair with tray placed for lunch. LE elevated on blankets to allow floor coupling with LE's. Warm heated blanket provided. Call light and phone within reach. Very appreciative for care. Treatment:  Patient practiced and was instructed in the following treatment:     Eval   Amb   Education    Pt's/ family goals   1. To have less pain and go home on D/C    Patient and or family understand(s) diagnosis, prognosis, and plan of care. PLAN:    PT care will be provided in accordance with the objectives noted above. Exercises and functional mobility practice will be used as well as appropriate assistive devices or modalities to obtain goals. Patient and family education will also be administered as needed. Frequency of treatments:  Daily BID     Total Treatment Time  10 minutes     Evaluation Time includes thorough review of current medical information, gathering information on past medical history/social history and prior level of function, completion of standardized testing/informal observation of tasks, assessment of data and education on plan of care and goals.     CPT codes:  [x] Low Complexity PT evaluation 38644  [] Moderate Complexity PT evaluation 14433  [] High Complexity PT evaluation 71198  [] PT Re-evaluation 25658  [x] Gait training 57964  minutes  [] Manual therapy 31663   minutes  [] Therapeutic activities 31659   minutes  [] Therapeutic exercises 14873   minutes  [] Neuromuscular reeducation 51965  minutes    [facility] PT

## 2021-02-27 NOTE — PROGRESS NOTES
Daily Treat        Attending Provider:  Loretta Hollis MD    Evaluating PT:  Callie Bender PT    Room #:  4730/5273-X  Diagnosis:  Left subcapital hip fracture  Pertinent PMHx/PSHx:  DM, HTN  Procedure/Surgery: HIP HEMIARTHROPLASTY LT  Precautions:  WBAT, Hip Posterior Hip Prec,   Equipment Needs:  WW, Abd pillow    SUBJECTIVE:    Pt lives with wife in a 2 story home with 2 stairs and 2 rail to enter. Master bed and bath on main floor. Pt ambulated with No AD PTA. OBJECTIVE:   Initial Evaluation  Date: 2/27/21 AM Treatment  2/27/21 PM Short Term/ Long Term   Goals   Was pt agreeable to Eval/treatment? Yes Yes    Does pt have pain? Yes LT hip 5/10 LT hip    Bed Mobility  Rolling: Min A  Supine to sit: Mod   Sit to supine: NA  Scooting: CGA/Min   Roll: Indep/S   Sup-sit: Mod/Min   Sit-sup: Mod A   Scoot: S/Indep  SBA/CGA all levels    Transfers Sit to stand: CGA/Min x 1  Stand to sit: S/SBA  Stand pivot: NA  Bed to stand:SBA/S   Chair-stand: S/SBA Indep/S all surfaces   Ambulation   20 feet with [employer] CGA  100' [employer] SBA/+ feet with [employer] S/Indep   Stair negotiation: ascended and descended  TBA  NA/TBA 4 steps with 2 rail S/Indep   ROM See below  See below  LT hip flex 90*   MMT See below  See below LT Hip 3+/5, knee 5/5   AM-PAC 6 Clicks 02/33 17/71      Pt is A & O x 3   Sensation:  Pt denies numbness and tingling to LTLE  Edema:  Post op LTLE  Balance: sitting:Indep standing: [employer] required @ CGA  Endurance: Functional, No NCO2 in use, No conversational dyspnea,   ROM: LT hip flex 70-90*, knee/ankle WNL  MMT: LT hip 3-/5, knee 5/5      Patient education  Pt re-educated on Hip prec, WBAT, bed mobility, transfers, walk to chair touch prior to sitting,     Patient response to education:   Pt verbalized understanding Pt demonstrated skill Pt requires further education in this area   Y Y Review hip prec with patient and wife      ASSESSMENT:    Comments: In bed on arrival receptive for getting OOB.   Pain under control LT hip/LE. In no distress in bed. Requiring Mod to min assisted bed mobility due to pain. No dizziness reported throughout mobility. Transfers with min assist levels. Can load LT hip/LE w/o buckle or inc c/o pain. Amb with ww with slowed pace, reciprocal step through w/o drifting, lateral veering or LOB. No reports of fatigue reported with 20' walk; returned to chair due to hot lunch just arrived. Educated on universal hip prec with wife. Remained in hip chair with tray placed for lunch. LE elevated on blankets to allow floor coupling with LE's. Warm heated blanket provided. Call light and phone within reach. Very appreciative for care. 2/27/21 PM: In bed on arrival and receptive for PM session. Mod/Min assist levels for bed mobility but improved from AM session. No dizziness throughout care. Transfers SBA/S levels from bed and hip chair. Amb with [employer] with reciprocal step through pattern w/o drifting, lateral veering, buckle or instability. Pace slow but consistent and non-labored. No conversational dyspnea or air hunger identified during mobility. Weakness with dec ROM LT hip but WNL knee/ankle. Reeducated on hip precautions and voices he understands all of them. Wanted to return to bed following care. Mod/Min assist LE to advance into bed form EOB. Abductor pillow placed and ICD's on. Call light and phone within reach. Very appreciative for care. Treatment:  Patient practiced and was instructed in the following treatment:     Amb   There-ex   Education    Pt's/ family goals   1. To have less pain and go home on D/C    Patient and or family understand(s) diagnosis, prognosis, and plan of care. PLAN:    PT care will be provided in accordance with the objectives noted above. Exercises and functional mobility practice will be used as well as appropriate assistive devices or modalities to obtain goals.  Patient and family education will also be administered as

## 2021-02-27 NOTE — ANESTHESIA POSTPROCEDURE EVALUATION
Department of Anesthesiology  Postprocedure Note    Patient: Krystyna Méndez  MRN: [id number]  YOB: 1945  Date of evaluation: 2/26/2021  Time:  9:01 PM     Procedure Summary     Date: 02/26/21 Room / Location: [address]    Anesthesia Start: 1315 Anesthesia Stop: 2045    Procedure: HIP HEMIARTHROPLASTY (Left Hip) Diagnosis: (HIP FRACTURE)    Surgeons: Cong Gonzalez MD Responsible Provider: Travis Blackman MD    Anesthesia Type: spinal ASA Status: 3          Anesthesia Type: spinal    Brian Phase I: Brian Score: 10    Brian Phase II:      Last vitals: Reviewed and per EMR flowsheets.        Anesthesia Post Evaluation    Patient location during evaluation: PACU  Patient participation: complete - patient participated  Level of consciousness: awake and alert  Airway patency: patent  Nausea & Vomiting: no vomiting and no nausea  Complications: no  Cardiovascular status: hemodynamically stable  Respiratory status: acceptable  Hydration status: stable

## 2021-02-27 NOTE — OP NOTE
options, complications, and convalescence were thoroughly reviewed with the patient regarding left hip bipolar anali arthroplasty. Patient understood these, voiced excellent informed consent, and wished to proceed with the operation. Operation:   After the patient was appropriately identified and surgical site was marked. Patient was brought back to the operating room where successful spinal block was placed by the anesthesia team.  Patient was then placed supine on the operating room table where the appropriate antibiotics were given within 1 hour prior to the start of the operation. The patient was then placed in the lateral decubitus position with all bony prominences well-padded and axillary roll was placed. Leg lengths were checked and an EKG pad was placed on the well leg at the inferior pole of patella to help assess leg length intra-operatively. Patient also had thigh-high JAMAAL hose and SCDs placed on the well leg. Operative extremity was then prepped and draped in a sterile fashion and a surgical timeout was performed. Curvilinear incision was then made based over the greater trochanter. Bovie dissection was carried down to the IT band. The IT band was then split in line with its fibers. Sciatic nerve was palpated and a Charnley retractor was then placed in the standard position out of the way of the sciatic nerve. A greater trochanteric bursa was excised. Interval between the gluteus medius and minimus was developed by finger dissection. And a cobra retractors placed in this interval.  The piriformis tendon was tagged and released from the piriformis fossa in the standard fashion. The interval between the gluteus minimus and capsule was then developed and the Cobra retractor was then placed in this interval.  Short external rotators and taken down off the posterior greater trochanter. Capsulotomy was then performed in a trapezoidal fashion and tagged for future repair.    The labrum was preserved. The hip was then gently dislocated without difficulty and the fracture identified. The femoral neck cut was then templated and then made while protecting the soft tissues. The femoral head was then sized and trialed utilized in the acetabulum. Size 50 bipolar head worked the best.       The lateral femoral neck was then taken down using a box osteotome. Canal finder was then utilized to gain entry into the intramedullary canal of the femur. Sequential broaching was then carried out from a size 0 up to a size 3 with the appropriate amount of anteversion. The calcar reamer was then utilized. The hip was trialed with the 127 degree neck, 26 +0 mm head,  and a 50 mm bipolar head size. The hip was then reduced. Hip had excellent stability with 90° of hip flexion and internal rotation to 80°. Leg lengths were checked. The hip was then gently dislocated. The broach was removed. An Accolade HFX Size 3 Press-fit femoral stem was then impacted in the same position as the trial broach. The hip was then re-trialed with trial femoral heads. The 26 -3 mm  head gave the best stability and leg lengths. Therefore a 26 -3 mm CoCr femoral head and 50 mm bipolar head was opened and assembled on the back table. The trunion was then cleaned and dried, then the head was impacted. The hip was then reduced. The hip was taken through range of motion and excellent stability with hip flexion and internal rotation up to 85° as well as leg lengths were appropriate. Wound was thoroughly irrigated and soaked in diluted Betadine solution for 5 minutes time. 1 g of Vancomycin powder was placed intra-articularly. Posterior capsule was then repaired through drill holes to the greater trochanter in a standard fashion along with the piriformis tendon. Sciatic nerve was palpated and noted be out of the way of the posterior repair. The wound was further irrigated.   The IT band was then closed with interrupted and running #1 Vicryl suture. The subcutaneous was closed with a running #1 Vicryl suture, interrupted 2-0 Vicryl suture, running 3-0 Monocryl and DermaFlex skin glue. Dry sterile dressing was then placed. Abduction pillow was placed. Patient was transferred to hospital bed and taken recovery room stable condition. Counts: All counts were corrected in the case.      Electronically signed by Jani Milan MD on 2/26/2021 at 8:15 PM

## 2021-02-28 VITALS
DIASTOLIC BLOOD PRESSURE: 65 MMHG | TEMPERATURE: 96.6 F | BODY MASS INDEX: 28.89 KG/M2 | HEIGHT: 62 IN | HEART RATE: 62 BPM | RESPIRATION RATE: 16 BRPM | OXYGEN SATURATION: 98 % | SYSTOLIC BLOOD PRESSURE: 137 MMHG | WEIGHT: 157 LBS

## 2021-02-28 LAB
ANION GAP SERPL CALCULATED.3IONS-SCNC: 9 MMOL/L (ref 7–16)
BUN BLDV-MCNC: 22 MG/DL (ref 8–23)
CALCIUM SERPL-MCNC: 8.4 MG/DL (ref 8.6–10.2)
CHLORIDE BLD-SCNC: 110 MMOL/L (ref 98–107)
CO2: 22 MMOL/L (ref 22–29)
CREAT SERPL-MCNC: 1.2 MG/DL (ref 0.7–1.2)
GFR AFRICAN AMERICAN: >60
GFR NON-AFRICAN AMERICAN: 59 ML/MIN/1.73
GLUCOSE BLD-MCNC: 152 MG/DL (ref 74–99)
HCT VFR BLD CALC: 33.5 % (ref 37–54)
HEMOGLOBIN: 11.4 G/DL (ref 12.5–16.5)
MCH RBC QN AUTO: 29.2 PG (ref 26–35)
MCHC RBC AUTO-ENTMCNC: 34 % (ref 32–34.5)
MCV RBC AUTO: 85.9 FL (ref 80–99.9)
METER GLUCOSE: 191 MG/DL (ref 74–99)
METER GLUCOSE: 273 MG/DL (ref 74–99)
PDW BLD-RTO: 14.2 FL (ref 11.5–15)
PLATELET # BLD: 123 E9/L (ref 130–450)
PMV BLD AUTO: 10.3 FL (ref 7–12)
POTASSIUM SERPL-SCNC: 3.9 MMOL/L (ref 3.5–5)
RBC # BLD: 3.9 E12/L (ref 3.8–5.8)
SODIUM BLD-SCNC: 141 MMOL/L (ref 132–146)
WBC # BLD: 6.6 E9/L (ref 4.5–11.5)

## 2021-02-28 PROCEDURE — 6370000000 HC RX 637 (ALT 250 FOR IP): Performed by: INTERNAL MEDICINE

## 2021-02-28 PROCEDURE — 36415 COLL VENOUS BLD VENIPUNCTURE: CPT

## 2021-02-28 PROCEDURE — 80048 BASIC METABOLIC PNL TOTAL CA: CPT

## 2021-02-28 PROCEDURE — 99239 HOSP IP/OBS DSCHRG MGMT >30: CPT | Performed by: INTERNAL MEDICINE

## 2021-02-28 PROCEDURE — 97116 GAIT TRAINING THERAPY: CPT

## 2021-02-28 PROCEDURE — 97530 THERAPEUTIC ACTIVITIES: CPT

## 2021-02-28 PROCEDURE — 82962 GLUCOSE BLOOD TEST: CPT

## 2021-02-28 PROCEDURE — 97110 THERAPEUTIC EXERCISES: CPT

## 2021-02-28 PROCEDURE — 2580000003 HC RX 258: Performed by: INTERNAL MEDICINE

## 2021-02-28 PROCEDURE — 2580000003 HC RX 258: Performed by: ORTHOPAEDIC SURGERY

## 2021-02-28 PROCEDURE — 6370000000 HC RX 637 (ALT 250 FOR IP): Performed by: ORTHOPAEDIC SURGERY

## 2021-02-28 PROCEDURE — 85027 COMPLETE CBC AUTOMATED: CPT

## 2021-02-28 RX ORDER — OXYCODONE HYDROCHLORIDE 5 MG/1
5 TABLET ORAL EVERY 4 HOURS PRN
Qty: 40 TABLET | Refills: 0 | Status: SHIPPED | OUTPATIENT
Start: 2021-02-28 | End: 2021-03-07

## 2021-02-28 RX ADMIN — OXYCODONE HYDROCHLORIDE 5 MG: 5 TABLET ORAL at 09:29

## 2021-02-28 RX ADMIN — SODIUM CHLORIDE, PRESERVATIVE FREE 10 ML: 5 INJECTION INTRAVENOUS at 10:50

## 2021-02-28 RX ADMIN — TAMSULOSIN HYDROCHLORIDE 0.4 MG: 0.4 CAPSULE ORAL at 09:29

## 2021-02-28 RX ADMIN — DONEPEZIL HYDROCHLORIDE 10 MG: 5 TABLET, FILM COATED ORAL at 09:29

## 2021-02-28 RX ADMIN — ATORVASTATIN CALCIUM 40 MG: 40 TABLET, FILM COATED ORAL at 09:29

## 2021-02-28 RX ADMIN — SODIUM CHLORIDE, PRESERVATIVE FREE 10 ML: 5 INJECTION INTRAVENOUS at 09:32

## 2021-02-28 RX ADMIN — FLUOXETINE HYDROCHLORIDE 10 MG: 10 TABLET ORAL at 09:29

## 2021-02-28 RX ADMIN — DOCUSATE SODIUM 50 MG AND SENNOSIDES 8.6 MG 1 TABLET: 8.6; 5 TABLET, FILM COATED ORAL at 09:29

## 2021-02-28 RX ADMIN — AMLODIPINE BESYLATE 5 MG: 5 TABLET ORAL at 09:29

## 2021-02-28 RX ADMIN — OXYCODONE HYDROCHLORIDE 5 MG: 5 TABLET ORAL at 13:29

## 2021-02-28 RX ADMIN — LOSARTAN POTASSIUM 100 MG: 50 TABLET, FILM COATED ORAL at 09:28

## 2021-02-28 RX ADMIN — ASPIRIN 325 MG: 325 TABLET, COATED ORAL at 09:29

## 2021-02-28 RX ADMIN — INSULIN LISPRO 1 UNITS: 100 INJECTION, SOLUTION INTRAVENOUS; SUBCUTANEOUS at 08:43

## 2021-02-28 RX ADMIN — OXYCODONE HYDROCHLORIDE 5 MG: 5 TABLET ORAL at 05:29

## 2021-02-28 RX ADMIN — PANTOPRAZOLE SODIUM 40 MG: 40 TABLET, DELAYED RELEASE ORAL at 05:29

## 2021-02-28 RX ADMIN — INSULIN LISPRO 3 UNITS: 100 INJECTION, SOLUTION INTRAVENOUS; SUBCUTANEOUS at 11:34

## 2021-02-28 ASSESSMENT — PAIN SCALES - GENERAL
PAINLEVEL_OUTOF10: 5
PAINLEVEL_OUTOF10: 4
PAINLEVEL_OUTOF10: 4
PAINLEVEL_OUTOF10: 5
PAINLEVEL_OUTOF10: 5

## 2021-02-28 ASSESSMENT — PAIN DESCRIPTION - ORIENTATION: ORIENTATION: LEFT

## 2021-02-28 NOTE — DISCHARGE SUMMARY
[facility] EMERGENCY SERVICE Physician Discharge Summary        [address] , [facility] [address]        [address] 51  [phone]    In 4 weeks      Ashutosh Truong MD  [address] [facility] [address]  [phone]          Activity level: Weightbearing as tolerated left lower extremity, posterior hip dislocation precautions as per Ortho    Diet: Dietary Nutrition Supplements: Standard High Calorie Oral Supplement  DIET CARB CONTROL;    Dispo: Home with home health care    Condition on discharge-stable    Patient ID:  Marva Hernandez  [id number]  82 y.o.  1945    Admit date: 2/25/2021    Discharge date and time:  2/28/2021  12:26 PM    Admission Diagnoses: Active Problems:    Hip fracture requiring operative repair, left, closed, initial encounter [facility])  Resolved Problems:    * No resolved hospital problems. *      Discharge Diagnoses: Active Problems:    Hip fracture requiring operative repair, left, closed, initial encounter [facility])  Resolved Problems:    * No resolved hospital problems. *      Consults:  IP CONSULT TO ORTHOPEDIC SURGERY  IP CONSULT TO [address] [place] [address]  Course:   He is a 71-year-old male with past medical history of hypertension, type 2 diabetes, hyperlipidemia, BPH, dementia, GERD came to ER with left hip pain following mechanical fall. Denied any loss of consciousness or head injury. He landed on left hip and started with severe pain and inability to ambulate. Work-up in ER with x-ray evidence of left femoral neck fracture. He was admitted on Ortho unit. This was managed as below. Left hip-femoral neck fracture-status post mechanical fall. Ortho was consulted & following. Underwent Left hip hemiarthroplasty on 2/26. Postop course as usual and unremarkable. He required straight cath once for urinary retention.   He said he had  issues with BPH at home too. Later  he was able to void without difficulty. He is advised to follow-up with PCP/Uro  as outpatient. PT/OT/CM were following on disch planning. He is  discharged home with home health care.  He is on asa 325 mg bid for dvt prophy as per ortho.     DM II - on discharge home meds were resumed.     HTN- resume as per home     HPL - on statin     BPH- on tamsulosin     Dementia - on aricept      GERD - on ppi    Discharge Exam:  Vitals:    02/27/21 2000 02/28/21 0015 02/28/21 0510 02/28/21 0800   BP: (!) 117/59 (!) 142/68 130/68 137/65   Pulse: 66 76 75 62   Resp: 18 16 16 16   Temp: 98.4 °F (36.9 °C) 99.2 °F (37.3 °C) 98.2 °F (36.8 °C) 96.6 °F (35.9 °C)   TempSrc: Oral Oral Oral Infrared   SpO2: 96% 92% 93% 98%   Weight:       Height:           General Appearance: alert and oriented to person, place and time,in no acute distress  Skin: warm and dry  Head: normocephalic and atraumatic  Eyes: pupils equal, round, and reactive to light, extraocular eye movements intact, conjunctivae normal  Neck: supple and non-tender without mass  Pulmonary/Chest: clear to auscultation bilaterally  Cardiovascular: normal rate, regular rhythm, normal S1 and S2  Abdomen: soft, non-tender, non-distended, normal bowel sounds, no masses or organomegaly  Extremities: no cyanosis, clubbing , left hip with dressing dry and intact  Musculoskeletal: normal range of motion  Neurologic:  no cranial nerve deficit,speech normal    LABS:  Recent Labs     02/26/21  1050 02/27/21  0319 02/28/21  0517    134 141   K 3.5 3.7 3.9    104 110*   CO2 24 22 22   BUN 12 13 22   CREATININE 0.9 0.9 1.2   GLUCOSE 80 198* 152*   CALCIUM 8.5* 8.0* 8.4*       Recent Labs     02/26/21  1050 02/27/21  0440 02/28/21  0517   WBC 7.7 8.3 6.6   RBC 4.70 4.60 3.90   HGB 13.4 13.5 11.4*   HCT 40.5 40.2 33.5*   MCV 86.2 87.4 85.9   MCH 28.5 29.3 29.2   MCHC 33.1 33.6 34.0   RDW 13.8 14.0 14.2    133 123*   MPV 10.0 9.9 10.3       No results for input(s): POCGLU in the last 72 hours. Imaging:   XR PELVIS (1-2 VIEWS)   Final Result   Left hip hemiarthroplasty with expected postsurgical changes. No acute   complication. XR HIP 2-3 VW W PELVIS LEFT   Final Result   Nondisplaced left femoral neck fracture      XR CHEST PORTABLE   Final Result   No pneumonia or pleural effusion. Patient Instructions:      Medication List      START taking these medications    oxyCODONE 5 MG immediate release tablet  Commonly known as: ROXICODONE  Take 1 tablet by mouth every 4 hours as needed for Pain for up to 7 days.         CHANGE how you take these medications    aspirin 325 MG EC tablet  Take 1 tablet by mouth 2 times daily  What changed:   · medication strength  · how much to take  · when to take this        CONTINUE taking these medications    amLODIPine 5 MG tablet  Commonly known as: NORVASC     atorvastatin 40 MG tablet  Commonly known as: LIPITOR     DONEPEZIL HCL PO     empagliflozin 25 MG tablet  Commonly known as: JARDIANCE     FLUoxetine 10 MG capsule  Commonly known as: PROZAC     glipiZIDE 10 MG tablet  Commonly known as: GLUCOTROL     losartan 100 MG tablet  Commonly known as: COZAAR     metFORMIN 1000 MG tablet  Commonly known as: GLUCOPHAGE     omeprazole 20 MG delayed release capsule  Commonly known as: PRILOSEC     OZEMPIC (0.25 OR 0.5 MG/DOSE) SC     tamsulosin 0.4 MG capsule  Commonly known as: FLOMAX           Where to Get Your Medications      These [facility] [address] [place] [address] [facility] [id number]    Hours: 24-hours Phone: [phone]   · aspirin 325 MG EC tablet     You can get these medications from any pharmacy    Bring a paper prescription for each of these medications  · oxyCODONE 5 MG immediate release tablet           Note that more than 30 minutes was spent in preparing discharge papers, discussing discharge with patient, medication review, etc.    Signed:  Electronically signed by Brian Duvall MD on 2/28/2021 at 12:26 PM    NOTE: This report was transcribed using voice recognition software. Every effort was made to ensure accuracy; however, inadvertent computerized transcription errors may be present.

## 2021-02-28 NOTE — PROGRESS NOTES
S: Sb Barber doing well this morning, POD2. States pain well controlled. No further urinary retention or complaints. Progressing appropriately with PT/OT. No complaints       O:  5 / 5 dorsiflexion/plantarflexion strength BLE        Light touch sensation intact        Aquacel dressing clean dry intact        Brisk capillary refill, negative andrea's, compartments soft and compressible        Breathing easily, AAOx3, NAD        Labs: reviewed, stable      A:  Postop day #2 s/p L hip bipolar hemiarthroplasty    P:  Stable         PT/OT  WBAT LLE, posterior hip dislocation precautions         DVT prophylaxis: TEDs, SCDs, Ecotrin 325mg bid x 6 weeks         Discharge planning: social work following, plan for d/c home with [facility]. Okay to discharge from ortho standpoint today.           Medical management per primary service

## 2021-02-28 NOTE — PROGRESS NOTES
Daily Treat        Attending Provider:  Denilson Evangelista MD    Evaluating PT:  José Traylortingham PT    Room #:  2415/9721-L  Diagnosis:  Left subcapital hip fracture  Pertinent PMHx/PSHx:  DM, HTN  Procedure/Surgery: HIP HEMIARTHROPLASTY LT  Precautions:  WBAT, Hip Posterior Hip Prec,   Equipment Needs:  WW, Abd pillow    SUBJECTIVE:    Pt lives with wife in a 2 story home with 2 stairs and 2 rail to enter. Master bed and bath on main floor. Pt ambulated with No AD PTA. OBJECTIVE:   Initial Evaluation  Date: 2/27/21 AM Treatment  2/28/21 PM Short Term/ Long Term   Goals   Was pt agreeable to Eval/treatment? Yes Yes    Does pt have pain? Yes LT hip 4/10 LT hip       Bed Mobility  Rolling: Min A  Supine to sit: Mod   Sit to supine: NA  Scooting: CGA/Min   Roll: Indep/S   Sup-sit: S/Indep   Sit-sup: S/Indep   Scoot: Indep  SBA/CGA all levels    Transfers Sit to stand: CGA/Min x 1  Stand to sit: S/SBA  Stand pivot: NA  Bed to stand:S/I   Chair-stand: S/I Indep/S all surfaces   Ambulation   20 feet with [employer] CGA  400' [employer] S/indep 100+ feet with [employer] S/Indep   Stair negotiation: ascended and descended  TBA  x 4 S/SBA with B/L HR guide 4 steps with 2 rail S/Indep   ROM See below  See below  LT hip flex 90*   MMT See below  See below LT Hip 3+/5, knee 5/5   AM-PAC 6 Clicks 41/80  72/06      Pt is A & O x 3   Sensation:  Pt denies numbness and tingling to LTLE  Edema:  Post op LTLE  Balance: sitting:Indep standing: [employer] required @ S/Indep  Endurance: Functional, No NCO2 in use, No conversational dyspnea,   ROM: LT hip flex 70-90*, knee/ankle WNL  MMT: LT hip 3-/5, knee 5/5      Patient education  Pt  re-educated on Hip prec/fully understands and follows, WBAT, bed mobility, transfers, steps/good carryover from AM session     Patient response to education:   Pt verbalized understanding Pt demonstrated skill Pt requires further education in this area   Y  Y  NO D/C today     ASSESSMENT:    Comments:   In bed on arrival receptive for getting OOB. Pain under control LT hip/LE. In no distress in bed. Requiring Mod to min assisted bed mobility due to pain. No dizziness reported throughout mobility. Transfers with min assist levels. Can load LT hip/LE w/o buckle or inc c/o pain. Amb with ww with slowed pace, reciprocal step through w/o drifting, lateral veering or LOB. No reports of fatigue reported with 20' walk; returned to chair due to hot lunch just arrived. Educated on universal hip prec with wife. Remained in hip chair with tray placed for lunch. LE elevated on blankets to allow floor coupling with LE's. Warm heated blanket provided. Call light and phone within reach. Very appreciative for care. 2/27/21 PM: In bed on arrival and receptive for PM session. Mod/Min assist levels for bed mobility but improved from AM session. No dizziness throughout care. Transfers SBA/S levels from bed and hip chair. Amb with [employer] with reciprocal step through pattern w/o drifting, lateral veering, buckle or instability. Pace slow but consistent and non-labored. No conversational dyspnea or air hunger identified during mobility. Weakness with dec ROM LT hip but WNL knee/ankle. Reeducated on hip precautions and voices he understands all of them. Wanted to return to bed following care. Mod/Min assist LE to advance into bed form EOB. Abductor pillow placed and ICD's on. Call light and phone within reach. Very appreciative for care. 2/28/21AM: In hip chair on arrival eating bfast and returned some 10 min later and agreed to participate. In no distress on arrival.  Wife in room assisting care. Reports pain 5/10 to RN and just given pain meds. Chair alarm in line and On position; ? Need for usage as he is alert and oriented with good self awareness. Re-educated patient and wife on universal hip precautions and they state understand all of them. AROM as prior day as well as MMT.   Improved general mobility in bed, transfers and mobilization. Gait pattern initial step to with a slow paced pattern. Able to transition into a step through but much time was spent educating him; gradually went back to step to pattern. Was a full step through pattern once steps negotiated and walk back to room w/o verbal cues for step through. No drifting, lateral veering or lOB observed. Much time approx 20' taken to mobilize. No fatigue or dyspnea with mobility. No inc pain reported during walking, stairs or there-ex. Educated patient multiple times and wife for stair negotiation sequencing one-at-a-time and which leg to lead with up/down; both state understand. Remained in hip chair with call light and phone within reach. Tray placed in front for LE support. Chair alarm reactivated ON. Very appreciative care. 2/28/21 PM: In bed on arrival receptive for PM session. Indep/S with bed mobility. Slower getting into and out of bed to EOB but indep/supervised levels improved from prior day. Amb with WW with a step to pattern heading to steps in rehab room but reciprocal on way back to room; slow paced but steady and safe. Steps negotiated with proper sequencing w/o cues. No instability throughout PM session. No dizziness reported. There-ex tolerated very well with no inc pain expressed during and post.  Wanted to remain in hip chair after there-ex. Chair alarm in line and ON position. Call light, wall phone and cell phone provided. Tray placed in front for full access and LE support on base tray. Very appreciative for care. RN informed in chair and ready for D/C. Treatment:  Patient practiced and was instructed in the following treatment:     Amb   Steps    Bed mobility   There-ex   Education    Pt's/ family goals   1. To have less pain and go home on D/C    Patient and or family understand(s) diagnosis, prognosis, and plan of care. PLAN:    PT care will be provided in accordance with the objectives noted above.  Exercises and functional mobility practice will be used as well as appropriate assistive devices or modalities to obtain goals. Patient and family education will also be administered as needed. Frequency of treatments:   D/C this afternoon    Total Treatment Time  35 minutes     Evaluation Time includes thorough review of current medical information, gathering information on past medical history/social history and prior level of function, completion of standardized testing/informal observation of tasks, assessment of data and education on plan of care and goals.     CPT codes:  [] Low Complexity PT evaluation 62812  [] Moderate Complexity PT evaluation 24934  [] High Complexity PT evaluation 71689  [] PT Re-evaluation 14567  [x] Gait training 36174 20  minutes  [] Manual therapy 14901   minutes  [x] Therapeutic activities 71223 5  minutes  [x] Therapeutic exercises 59296 10  minutes  [] Neuromuscular reeducation 06576  minutes    Carlos Marques PT

## 2021-02-28 NOTE — PROGRESS NOTES
Patient IV removed DC paper work provided, questions answered.  Transport requested, Paper prescription provided

## 2021-02-28 NOTE — PROGRESS NOTES
Daily Treat        Attending Provider:  Faizan Ricci MD    Evaluating PT:  Alexander Blu PT    Room #:  7270/4591-G  Diagnosis:  Left subcapital hip fracture  Pertinent PMHx/PSHx:  DM, HTN  Procedure/Surgery: HIP HEMIARTHROPLASTY LT  Precautions:  WBAT, Hip Posterior Hip Prec,   Equipment Needs:  WW, Abd pillow    SUBJECTIVE:    Pt lives with wife in a 2 story home with 2 stairs and 2 rail to enter. Master bed and bath on main floor. Pt ambulated with No AD PTA. OBJECTIVE:   Initial Evaluation  Date: 2/27/21 AM Treatment  2/28/21 AM Short Term/ Long Term   Goals   Was pt agreeable to Eval/treatment? Yes Yes    Does pt have pain? Yes LT hip 5/10 LT hip, RN just giving pain meds    Bed Mobility  Rolling: Min A  Supine to sit: Mod   Sit to supine: NA  Scooting: CGA/Min   Roll: Indep/S   Sup-sit: S/SBA   Sit-sup: S/SBA   Scoot:  Indep  SBA/CGA all levels    Transfers Sit to stand: CGA/Min x 1  Stand to sit: S/SBA  Stand pivot: NA  Bed to stand:S/I   Chair-stand: S/I Indep/S all surfaces   Ambulation   20 feet with [employer] CGA  400' [employer] SBA/S total 400' 100+ feet with [employer] S/Indep   Stair negotiation: ascended and descended  TBA  x 4 CGA/SBA with B/l HR guide 4 steps with 2 rail S/Indep   ROM See below  See below  LT hip flex 90*   MMT See below  See below LT Hip 3+/5, knee 5/5   AM-PAC 6 Clicks 83/32  84/12      Pt is A & O x 3   Sensation:  Pt denies numbness and tingling to LTLE  Edema:  Post op LTLE  Balance: sitting:Indep standing: [employer] required @ S/SBA  Endurance: Functional, No NCO2 in use, No conversational dyspnea,   ROM: LT hip flex 70-90*, knee/ankle WNL  MMT: LT hip 3-/5, knee 5/5      Patient education  Pt and wife re-educated on Hip prec, WBAT, bed mobility, transfers, walk to chair touch prior to sitting, steps,     Patient response to education:   Pt verbalized understanding Pt demonstrated skill Pt requires further education in this area   Y Y Patient and wife report understand all hip prec MMT.  Improved general mobility in bed, transfers and mobilization. Gait pattern initial step to with a slow paced pattern. Able to transition into a step through but much time was spent educating him; gradually went back to step to pattern. Was a full step through pattern once steps negotiated and walk back to room w/o verbal cues for step through. No drifting, lateral veering or lOB observed. Much time approx 20' taken to mobilize. No fatigue or dyspnea with mobility. No inc pain reported during walking, stairs or there-ex. Educated patient multiple times and wife for stair negotiation sequencing one-at-a-time and which leg to lead with up/down; both state understand. Remained in hip chair with call light and phone within reach. Tray placed in front for LE support. Chair alarm reactivated ON. Very appreciative care. Treatment:  Patient practiced and was instructed in the following treatment:     Amb   Steps    Bed mobility   There-ex   Education    Pt's/ family goals   1. To have less pain and go home on D/C    Patient and or family understand(s) diagnosis, prognosis, and plan of care. PLAN:    PT care will be provided in accordance with the objectives noted above. Exercises and functional mobility practice will be used as well as appropriate assistive devices or modalities to obtain goals. Patient and family education will also be administered as needed. Frequency of treatments:  Daily BID     Total Treatment Time  40 minutes     Evaluation Time includes thorough review of current medical information, gathering information on past medical history/social history and prior level of function, completion of standardized testing/informal observation of tasks, assessment of data and education on plan of care and goals.     CPT codes:  [] Low Complexity PT evaluation 26403  [] Moderate Complexity PT evaluation 12453  [] High Complexity PT evaluation 52077  [] PT Re-evaluation 38222  [x] Gait training 75571 20  minutes  [] Manual therapy 47503   minutes  [x] Therapeutic activities 55671 10  minutes  [x] Therapeutic exercises 97547 10  minutes  [] Neuromuscular reeducation 73206  minutes    Rosa Garcia PT

## 2021-06-21 ENCOUNTER — HOSPITAL ENCOUNTER (OUTPATIENT)
Age: 76
Discharge: HOME | End: 2021-06-21
Payer: OTHER GOVERNMENT

## 2021-06-21 LAB
ALBUMIN SERPL-MCNC: 4.4 G/DL (ref 3.5–5.2)
ALP BLD-CCNC: 186 U/L (ref 40–129)
ALT SERPL-CCNC: 28 U/L (ref 0–40)
ANION GAP SERPL CALCULATED.3IONS-SCNC: 6 MMOL/L (ref 7–16)
AST SERPL-CCNC: 23 U/L (ref 0–39)
BACTERIA: NORMAL /HPF
BILIRUB SERPL-MCNC: 0.6 MG/DL (ref 0–1.2)
BILIRUBIN URINE: NEGATIVE
BLOOD, URINE: ABNORMAL
BUN BLDV-MCNC: 16 MG/DL (ref 6–23)
CALCIUM SERPL-MCNC: 9.5 MG/DL (ref 8.6–10.2)
CHLORIDE BLD-SCNC: 104 MMOL/L (ref 98–107)
CLARITY: CLEAR
CO2: 28 MMOL/L (ref 22–29)
COLOR: YELLOW
CREAT SERPL-MCNC: 1 MG/DL (ref 0.7–1.2)
GFR AFRICAN AMERICAN: >60
GFR NON-AFRICAN AMERICAN: >60 ML/MIN/1.73
GLUCOSE BLD-MCNC: 154 MG/DL (ref 74–99)
GLUCOSE URINE: NEGATIVE MG/DL
KETONES, URINE: NEGATIVE MG/DL
LEUKOCYTE ESTERASE, URINE: NEGATIVE
NITRITE, URINE: NEGATIVE
PH UA: 6 (ref 5–9)
POTASSIUM SERPL-SCNC: 4.6 MMOL/L (ref 3.5–5)
PROTEIN UA: >=300 MG/DL
RBC UA: NORMAL /HPF (ref 0–2)
SODIUM BLD-SCNC: 138 MMOL/L (ref 132–146)
SPECIFIC GRAVITY UA: >=1.03 (ref 1–1.03)
TOTAL PROTEIN: 7.3 G/DL (ref 6.4–8.3)
UROBILINOGEN, URINE: 0.2 E.U./DL
WBC UA: NORMAL /HPF (ref 0–5)

## 2021-06-21 PROCEDURE — 36415 COLL VENOUS BLD VENIPUNCTURE: CPT

## 2021-06-21 PROCEDURE — 80053 COMPREHEN METABOLIC PANEL: CPT

## 2021-06-21 PROCEDURE — 81001 URINALYSIS AUTO W/SCOPE: CPT

## 2021-12-28 ENCOUNTER — HOSPITAL ENCOUNTER (OUTPATIENT)
Dept: ULTRASOUND IMAGING | Age: 76
Discharge: HOME | End: 2021-12-30
Payer: OTHER GOVERNMENT

## 2021-12-28 DIAGNOSIS — K71.8 TOXIC LIVER DISEASE WITH OTHER DISORDERS OF LIVER: ICD-10-CM

## 2021-12-28 PROCEDURE — 76705 ECHO EXAM OF ABDOMEN: CPT

## 2022-04-18 ENCOUNTER — HOSPITAL ENCOUNTER (OUTPATIENT)
Dept: ULTRASOUND IMAGING | Age: 77
Discharge: HOME | End: 2022-04-20
Payer: MEDICARE

## 2022-04-18 DIAGNOSIS — K77 LIVER DISORDERS IN DISEASES CLASSIFIED ELSEWHERE: ICD-10-CM

## 2022-04-18 PROCEDURE — 76705 ECHO EXAM OF ABDOMEN: CPT

## (undated) DEVICE — GLOVE ORANGE PI 8 1/2   MSG9085

## (undated) DEVICE — TOWEL,OR,DSP,ST,BLUE,STD,6/PK,12PK/CS: Brand: MEDLINE

## (undated) DEVICE — 1000 S-DRAPE TOWEL DRAPE 10/BX: Brand: STERI-DRAPE™

## (undated) DEVICE — INSTRUMENT CLAMP TOWEL LARGE REUSABLE

## (undated) DEVICE — SOLUTION IV 500ML 0.9% SOD CHL PH 5 INJ USP VIAFLX PLAS

## (undated) DEVICE — GLOVE ORANGE PI 7   MSG9070

## (undated) DEVICE — GLOVE SURG SZ 85 L12IN FNGR THK94MIL TRNSLUC YEL LTX

## (undated) DEVICE — INTENDED FOR TISSUE SEPARATION, AND OTHER PROCEDURES THAT REQUIRE A SHARP SURGICAL BLADE TO PUNCTURE OR CUT.: Brand: BARD-PARKER ® STAINLESS STEEL BLADES

## (undated) DEVICE — BLADE ES L6IN ELASTOMERIC COAT EXT DURABLE BEND UPTO 90DEG

## (undated) DEVICE — 3M™ IOBAN™ 2 ANTIMICROBIAL INCISE DRAPE 6650EZ: Brand: IOBAN™ 2

## (undated) DEVICE — 3M™ COBAN™ NL STERILE NON-LATEX SELF-ADHERENT WRAP, 2084S, 4 IN X 5 YD (10 CM X 4,5 M), 18 ROLLS/CASE: Brand: 3M™ COBAN™

## (undated) DEVICE — SOLUTION IV IRRIG WATER 1000ML POUR BRL 2F7114

## (undated) DEVICE — RACK TUBE CURETTE

## (undated) DEVICE — INSTRUMENT SYSTEM 7 BATTERY REUSABLE

## (undated) DEVICE — TRAY SYSTEM 7 DRILL REUSABLE

## (undated) DEVICE — ADHESIVE SKIN CLSR 0.7ML TOP DERMBND ADV

## (undated) DEVICE — DECANTER: Brand: UNBRANDED

## (undated) DEVICE — CHLORAPREP 26ML ORANGE

## (undated) DEVICE — 2108 SERIES SAGITTAL BLADE, OFFSET (20.0 X 0.89 X 80.0MM)

## (undated) DEVICE — BIT DRL L5IN DIA2MM STD ST S STL TWST BUSA

## (undated) DEVICE — SYRINGE 20ML LL S/C 50

## (undated) DEVICE — TOTAL HIP PK

## (undated) DEVICE — EXTRAS HIP

## (undated) DEVICE — TUBING, SUCTION, 9/32" X 10', STRAIGHT: Brand: MEDLINE

## (undated) DEVICE — RETRACTOR KNE PCA

## (undated) DEVICE — SET LAMBOTTE

## (undated) DEVICE — KIT SURG PREP POVIDONE IOD PRESATURATED PAINT WET FOR UNIV

## (undated) DEVICE — DRESSING PETRO W3XL8IN OIL EMUL N ADH GZ KNIT IMPREG CELOS

## (undated) DEVICE — GOWN,SIRUS,FABRNF,2XL,18/CS: Brand: MEDLINE

## (undated) DEVICE — GLOVE SURG SZ 65 L12IN FNGR THK126MIL CRM LTX FREE

## (undated) DEVICE — HEWSON SUTURE RETRIEVER: Brand: HEWSON SUTURE RETRIEVER

## (undated) DEVICE — SOLUTION IV IRRIG POUR BRL 0.9% SODIUM CHL 2F7124

## (undated) DEVICE — COVER HNDL LT DISP

## (undated) DEVICE — GRADUATE

## (undated) DEVICE — Z DISCONTINUED PER MEDLINE USE 2741943 DRESSING AQUACEL 10 IN ALG W9XL25CM SIL CVR WTRPRF VIR BACT BARR ANTIMIC

## (undated) DEVICE — MARKER,SKIN,WI/RULER AND LABELS: Brand: MEDLINE

## (undated) DEVICE — ELECTRODE PT RET AD L9FT HI MOIST COND ADH HYDRGEL CORDED

## (undated) DEVICE — NEEDLE HYPO 22GA L1.5IN BLK POLYPR HUB S STL REG BVL STR

## (undated) DEVICE — PACK PROCEDURE SURG GEN CUST

## (undated) DEVICE — 4-PORT MANIFOLD: Brand: NEPTUNE 2

## (undated) DEVICE — SET ORTHO STD STORTSTD1

## (undated) DEVICE — PEEL-AWAY HOOD: Brand: FLYTE, SURGICOOL

## (undated) DEVICE — DOUBLE BASIN SET: Brand: MEDLINE INDUSTRIES, INC.

## (undated) DEVICE — Z INACTIVE USE 2660664 SOLUTION IRRIG 3000ML 0.9% SOD CHL USP UROMATIC PLAS CONT

## (undated) DEVICE — SHOECOVER ANTI-SKID: Brand: CARDINAL HEALTH

## (undated) DEVICE — GLOVE SURG SZ 9 L12IN FNGR THK126MIL CRM LTX FREE

## (undated) DEVICE — 3M™ STERI-DRAPE™ U-DRAPE 1015: Brand: STERI-DRAPE™

## (undated) DEVICE — TUBE IRRIG HNDPC HI FLO TP INTRPULS W/SUCTION TUBE